# Patient Record
Sex: FEMALE | Race: WHITE | NOT HISPANIC OR LATINO | Employment: OTHER | URBAN - NONMETROPOLITAN AREA
[De-identification: names, ages, dates, MRNs, and addresses within clinical notes are randomized per-mention and may not be internally consistent; named-entity substitution may affect disease eponyms.]

---

## 2023-12-22 ENCOUNTER — TELEPHONE (OUTPATIENT)
Dept: NEUROSURGERY | Facility: CLINIC | Age: 53
End: 2023-12-22
Payer: MEDICAID

## 2023-12-22 NOTE — TELEPHONE ENCOUNTER
PT CALLED STATES OUR OFFICE HAS TO SEND A REQUEST TO THE [facility] IN [facility] FOR THEM TO SEND US THE IMAGING ON DISC. FAX NUMBER [phone]. PT STATES REQUEST HAS TO STATE CONTINUE OF CARE.     THANK YOU,

## 2023-12-22 NOTE — TELEPHONE ENCOUNTER
I HAVE SENT OVER FAX REQUEST FOR IMAGING TO NUMBER PROVIDED. I NOTED WE HAVE [employer] NUMBER AVAILABLE TO MAIL THE DISC TO US IF THEY NEED IT, TO CALL ME DIRECTLY.

## 2024-01-10 ENCOUNTER — OFFICE VISIT (OUTPATIENT)
Dept: NEUROSURGERY | Facility: CLINIC | Age: 54
End: 2024-01-10
Payer: MEDICAID

## 2024-01-10 VITALS — HEIGHT: 66 IN | BODY MASS INDEX: 36.22 KG/M2 | WEIGHT: 225.4 LBS

## 2024-01-10 DIAGNOSIS — E66.01 CLASS 2 SEVERE OBESITY DUE TO EXCESS CALORIES WITH SERIOUS COMORBIDITY AND BODY MASS INDEX (BMI) OF 36.0 TO 36.9 IN ADULT: ICD-10-CM

## 2024-01-10 DIAGNOSIS — D32.9 MENINGIOMA: ICD-10-CM

## 2024-01-10 DIAGNOSIS — Z98.890 HISTORY OF CRANIOTOMY: Primary | ICD-10-CM

## 2024-01-10 DIAGNOSIS — Z98.2 S/P VP SHUNT: ICD-10-CM

## 2024-01-10 RX ORDER — UBIDECARENONE 75 MG
500 CAPSULE ORAL DAILY
COMMUNITY

## 2024-01-10 RX ORDER — BIMATOPROST 0.1 MG/ML
SOLUTION/ DROPS OPHTHALMIC
COMMUNITY
Start: 2023-12-19

## 2024-01-10 RX ORDER — TRAZODONE HYDROCHLORIDE 300 MG/1
1 TABLET ORAL
COMMUNITY
Start: 2023-12-04

## 2024-01-10 RX ORDER — ESCITALOPRAM OXALATE 10 MG/1
10 TABLET ORAL DAILY
COMMUNITY

## 2024-01-10 RX ORDER — LATANOPROST 0.005 %
1 DROPS OPHTHALMIC (EYE)
COMMUNITY
Start: 2023-11-15

## 2024-01-10 RX ORDER — LEVETIRACETAM 1000 MG/1
1000 TABLET ORAL
COMMUNITY
Start: 2023-11-22

## 2024-01-10 NOTE — PATIENT INSTRUCTIONS
"DASH Eating Plan  DASH stands for Dietary Approaches to Stop Hypertension. The DASH eating plan is a healthy eating plan that has been shown to:  Reduce high blood pressure (hypertension).  Reduce your risk for type 2 diabetes, heart disease, and stroke.  Help with weight loss.  What are tips for following this plan?  Reading food labels  Check food labels for the amount of salt (sodium) per serving. Choose foods with less than 5 percent of the Daily Value of sodium. Generally, foods with less than 300 milligrams (mg) of sodium per serving fit into this eating plan.  To find whole grains, look for the word \"whole\" as the first word in the ingredient list.  Shopping  Buy products labeled as \"low-sodium\" or \"no salt added.\"  Buy fresh foods. Avoid canned foods and pre-made or frozen meals.  Cooking  Avoid adding salt when cooking. Use salt-free seasonings or herbs instead of table salt or sea salt. Check with your health care provider or pharmacist before using salt substitutes.  Do not aquino foods. Cook foods using healthy methods such as baking, boiling, grilling, roasting, and broiling instead.  Cook with heart-healthy oils, such as olive, canola, avocado, soybean, or sunflower oil.  Meal planning    Eat a balanced diet that includes:  4 or more servings of fruits and 4 or more servings of vegetables each day. Try to fill one-half of your plate with fruits and vegetables.  6-8 servings of whole grains each day.  Less than 6 oz (170 g) of lean meat, poultry, or fish each day. A 3-oz (85-g) serving of meat is about the same size as a deck of cards. One egg equals 1 oz (28 g).  2-3 servings of low-fat dairy each day. One serving is 1 cup (237 mL).  1 serving of nuts, seeds, or beans 5 times each week.  2-3 servings of heart-healthy fats. Healthy fats called omega-3 fatty acids are found in foods such as walnuts, flaxseeds, fortified milks, and eggs. These fats are also found in cold-water fish, such as sardines, salmon, " and mackerel.  Limit how much you eat of:  Canned or prepackaged foods.  Food that is high in trans fat, such as some fried foods.  Food that is high in saturated fat, such as fatty meat.  Desserts and other sweets, sugary drinks, and other foods with added sugar.  Full-fat dairy products.  Do not salt foods before eating.  Do not eat more than 4 egg yolks a week.  Try to eat at least 2 vegetarian meals a week.  Eat more home-cooked food and less restaurant, buffet, and fast food.  Lifestyle  When eating at a restaurant, ask that your food be prepared with less salt or no salt, if possible.  If you drink alcohol:  Limit how much you use to:  0-1 drink a day for women who are not pregnant.  0-2 drinks a day for men.  Be aware of how much alcohol is in your drink. In the [place] one drink equals one 12 oz bottle of beer (355 mL), one 5 oz glass of wine (148 mL), or one 1½ oz glass of hard liquor (44 mL).  General information  Avoid eating more than 2,300 mg of salt a day. If you have hypertension, you may need to reduce your sodium intake to 1,500 mg a day.  Work with your health care provider to maintain a healthy body weight or to lose weight. Ask what an ideal weight is for you.  Get at least 30 minutes of exercise that causes your heart to beat faster (aerobic exercise) most days of the week. Activities may include walking, swimming, or biking.  Work with your health care provider or dietitian to adjust your eating plan to your individual calorie needs.  What foods should I eat?  Fruits  All fresh, dried, or frozen fruit. Canned fruit in natural juice (without added sugar).  Vegetables  Fresh or frozen vegetables (raw, steamed, roasted, or grilled). Low-sodium or reduced-sodium tomato and vegetable juice. Low-sodium or reduced-sodium tomato sauce and tomato paste. Low-sodium or reduced-sodium canned vegetables.  Grains  Whole-grain or whole-wheat bread. Whole-grain or whole-wheat pasta. Brown rice. Oatmeal. Quinoa.  Bulgur. Whole-grain and low-sodium cereals. Tiara bread. Low-fat, low-sodium crackers. Whole-wheat flour tortillas.  Meats and other proteins  Skinless chicken or turkey. Ground chicken or turkey. Pork with fat trimmed off. Fish and seafood. Egg whites. Dried beans, peas, or lentils. Unsalted nuts, nut butters, and seeds. Unsalted canned beans. Lean cuts of beef with fat trimmed off. Low-sodium, lean precooked or cured meat, such as sausages or meat loaves.  Dairy  Low-fat (1%) or fat-free (skim) milk. Reduced-fat, low-fat, or fat-free cheeses. Nonfat, low-sodium ricotta or cottage cheese. Low-fat or nonfat yogurt. Low-fat, low-sodium cheese.  Fats and oils  Soft margarine without trans fats. Vegetable oil. Reduced-fat, low-fat, or light mayonnaise and salad dressings (reduced-sodium). Canola, safflower, olive, avocado, soybean, and sunflower oils. Avocado.  Seasonings and condiments  Herbs. Spices. Seasoning mixes without salt.  Other foods  Unsalted popcorn and pretzels. Fat-free sweets.  The items listed above may not be a complete list of foods and beverages you can eat. Contact a dietitian for more information.  What foods should I avoid?  Fruits  Canned fruit in a light or heavy syrup. Fried fruit. Fruit in cream or butter sauce.  Vegetables  Creamed or fried vegetables. Vegetables in a cheese sauce. Regular canned vegetables (not low-sodium or reduced-sodium). Regular canned tomato sauce and paste (not low-sodium or reduced-sodium). Regular tomato and vegetable juice (not low-sodium or reduced-sodium). Pickles. Olives.  Grains  Baked goods made with fat, such as croissants, muffins, or some breads. Dry pasta or rice meal packs.  Meats and other proteins  Fatty cuts of meat. Ribs. Fried meat. Tariq. Bologna, salami, and other precooked or cured meats, such as sausages or meat loaves. Fat from the back of a pig (fatback). Bratwurst. Salted nuts and seeds. Canned beans with added salt. Canned or smoked fish.  Whole eggs or egg yolks. Chicken or turkey with skin.  Dairy  Whole or 2% milk, cream, and half-and-half. Whole or full-fat cream cheese. Whole-fat or sweetened yogurt. Full-fat cheese. Nondairy creamers. Whipped toppings. Processed cheese and cheese spreads.  Fats and oils  Butter. Stick margarine. Lard. Shortening. Ghee. Tariq fat. Tropical oils, such as coconut, palm kernel, or palm oil.  Seasonings and condiments  Onion salt, garlic salt, seasoned salt, table salt, and sea salt. Worcestershire sauce. Tartar sauce. Barbecue sauce. Teriyaki sauce. Soy sauce, including reduced-sodium. Steak sauce. Canned and packaged gravies. Fish sauce. Oyster sauce. Cocktail sauce. Store-bought horseradish. Ketchup. Mustard. Meat flavorings and tenderizers. Bouillon cubes. Hot sauces. Pre-made or packaged marinades. Pre-made or packaged taco seasonings. Relishes. Regular salad dressings.  Other foods  Salted popcorn and pretzels.  The items listed above may not be a complete list of foods and beverages you should avoid. Contact a dietitian for more information.  Where to find more information  National Heart, Lung, and Blood [place]: www.nhlbi.nih.gov  American Heart Association: www.heart.org  Academy of Nutrition and Dietetics: www.eatright.org  National Kidney Foundation: www.kidney.org  Summary  The DASH eating plan is a healthy eating plan that has been shown to reduce high blood pressure (hypertension). It may also reduce your risk for type 2 diabetes, heart disease, and stroke.  When on the DASH eating plan, aim to eat more fresh fruits and vegetables, whole grains, lean proteins, low-fat dairy, and heart-healthy fats.  With the DASH eating plan, you should limit salt (sodium) intake to 2,300 mg a day. If you have hypertension, you may need to reduce your sodium intake to 1,500 mg a day.  Work with your health care provider or dietitian to adjust your eating plan to your individual calorie needs.  This information is not  intended to replace advice given to you by your health care provider. Make sure you discuss any questions you have with your health care provider.  Document Revised: 11/20/2020 Document Reviewed: 11/20/2020  Elsevier Patient Education © 2023 Elsevier Inc.

## 2024-01-10 NOTE — PROGRESS NOTES
Primary Care Provider: Hali George APRN  Requesting Provider: No ref. provider found    Chief Complaint:   Chief Complaint   Patient presents with    Brain Tumor     Pt is here to establish care.  History of craniotomy for tumor and  shunt placement     History of Present Illness  Consultation at the request of No ref. provider found.    LANA Santiago is a 53 y.o. female whom presents today to establish care.  History of a craniotomy for WHO grade 2 left cavernous sinus meningioma in 2014 by Dr. [facility] [place], [place].  Followed by a right ventriculoperitoneal shunt placement.  Preoperatively she complained of persistent headaches, visual disturbances, dizziness.  Her symptoms improved postoperatively.  She is unsure at what point the  shunt was placed or causative factors and/or symptoms surrounding  shunt placement.    Ms. Santiago presents today asymptomatic to establish care.  Her primary recurrent complaints include a chronic and unchanged impaired memory and a left ocular and frontal headache.  Her last headache occurred approximately 2 weeks ago and is poorly well-controlled with Tylenol as she was previously prescribed oxycodone by Dr. Rose.  She denies additional symptoms associated with her headaches to include, but not limited to dizziness, blurred vision, photophobia, phonophobia, nausea, or vomiting.  Otherwise, she is doing well and has no new complaints or concerns.  She was last evaluated by Dr. Rose in March 2021.  Per his note, most recent MRI obtained prior to that encounter showed no significant change in residual tumor.   shunt setting at 2.5.  No additional questions or concerns at this time.  Ms. Santiago currently rates the severity of her symptoms 0/10.    ROS  Review of Systems   HENT:  Positive for dental problem.    Eyes: Negative.    Respiratory: Negative.     Cardiovascular:  Positive for leg swelling.   Gastrointestinal: Negative.   "  Endocrine: Negative.    Genitourinary: Negative.    Musculoskeletal: Negative.    Skin: Negative.    Allergic/Immunologic: Negative.    Neurological:  Positive for seizures, speech difficulty, weakness, light-headedness and headaches.   Hematological: Negative.    Psychiatric/Behavioral:  Positive for sleep disturbance.    All other systems reviewed and are negative.    Past Medical History:   Diagnosis Date    Headache      Past Surgical History:   Procedure Laterality Date    BRAIN SURGERY      craniotomy for WHO grade 2 left cavernous sinus meningioma in 2014 by Dr. Rose    SHOULDER SURGERY      VENTRICULOPERITONEAL SHUNT Right     Current setting 2.5 cm H2O     Family History: family history includes Cancer in her brother and mother; No Known Problems in her father.    Social History:  reports that she has quit smoking. Her smoking use included cigarettes. She does not have any smokeless tobacco history on file. She reports current alcohol use. She reports current drug use. Drug: Marijuana.    Medications:    Current Outpatient Medications:     escitalopram (LEXAPRO) 10 MG tablet, Take 1 tablet by mouth Daily., Disp: , Rfl:     levETIRAcetam (KEPPRA) 1000 MG tablet, Take 1 tablet by mouth., Disp: , Rfl:     Lumigan 0.01 % ophthalmic drops, INSTILL ONE DROP IN EACH EYE IN THE EVENING, Disp: , Rfl:     traZODone (DESYREL) 300 MG tablet, Take 1 tablet by mouth every night at bedtime., Disp: , Rfl:     vitamin B-12 (cyanocobalamin) 100 MCG tablet, Take 5 tablets by mouth Daily., Disp: , Rfl:     Xalatan 0.005 % ophthalmic solution, Apply 1 drop to eye(s) as directed by provider., Disp: , Rfl:     Allergies:  Patient has no known allergies.    Objective   Ht 167.6 cm (66\")   Wt 102 kg (225 lb 6.4 oz)   BMI 36.38 kg/m²   Physical Exam  Vitals and nursing note reviewed.   Constitutional:       General: She is not in acute distress.     Appearance: Normal appearance. She is well-developed and well-groomed. She is " obese. She is not ill-appearing, toxic-appearing or diaphoretic.      Comments: BMI 36.38   HENT:      Head: Normocephalic and atraumatic.      Right Ear: Hearing normal.      Left Ear: Hearing normal.   Eyes:      Extraocular Movements: EOM normal.      Conjunctiva/sclera: Conjunctivae normal.      Pupils: Pupils are equal, round, and reactive to light.   Neck:      Trachea: Trachea normal.   Cardiovascular:      Rate and Rhythm: Normal rate and regular rhythm.   Pulmonary:      Effort: Pulmonary effort is normal. No tachypnea, bradypnea, accessory muscle usage or respiratory distress.   Abdominal:      Palpations: Abdomen is soft.   Musculoskeletal:      Cervical back: Full passive range of motion without pain and neck supple.   Skin:     General: Skin is warm and dry.   Neurological:      Mental Status: She is alert and oriented to person, place, and time.      GCS: GCS eye subscore is 4. GCS verbal subscore is 5. GCS motor subscore is 6.      Gait: Gait is intact.      Deep Tendon Reflexes:      Reflex Scores:       Tricep reflexes are 1+ on the right side and 1+ on the left side.       Bicep reflexes are 1+ on the right side and 1+ on the left side.       Brachioradialis reflexes are 1+ on the right side and 1+ on the left side.       Patellar reflexes are 1+ on the right side and 1+ on the left side.       Achilles reflexes are 1+ on the right side and 1+ on the left side.  Psychiatric:         Speech: Speech normal.         Behavior: Behavior normal. Behavior is cooperative.       Neurologic Exam     Mental Status   Oriented to person, place, and time.   Attention: normal. Concentration: normal.   Speech: speech is normal   Level of consciousness: alert    Cranial Nerves     CN II   Visual fields full to confrontation.     CN III, IV, VI   Pupils are equal, round, and reactive to light.  Extraocular motions are normal.     CN V   Facial sensation intact.     CN VII   Facial expression full, symmetric.     CN  VIII   CN VIII normal.     CN IX, X   CN IX normal.     CN XI   CN XI normal.     Motor Exam   Right arm tone: normal  Left arm tone: normal  Right leg tone: normal  Left leg tone: normal    Strength   Right deltoid: 5/5  Left deltoid: 5/5  Right biceps: 5/5  Left biceps: 5/5  Right triceps: 5/5  Left triceps: 5/5  Right wrist extension: 5/5  Left wrist extension: 5/5  Right iliopsoas: 5/5  Left iliopsoas: 5/5  Right quadriceps: 5/5  Left quadriceps: 5/5  Right anterior tibial: 5/5  Left anterior tibial: 5/5  Right gastroc: 5/5  Left gastroc: 5/5  Right EHL 5/5  Left EHL 5/5       Sensory Exam   Right arm light touch: normal  Left arm light touch: normal  Right leg light touch: normal  Left leg light touch: normal    Gait, Coordination, and Reflexes     Gait  Gait: normal    Tremor   Resting tremor: absent  Intention tremor: absent  Action tremor: absent    Reflexes   Right brachioradialis: 1+  Left brachioradialis: 1+  Right biceps: 1+  Left biceps: 1+  Right triceps: 1+  Left triceps: 1+  Right patellar: 1+  Left patellar: 1+  Right achilles: 1+  Left achilles: 1+  Right plantar: normal  Left plantar: normal  Right Veliz: absent  Left Veliz: absent  Right ankle clonus: absent  Left ankle clonus: absent  Right pendular knee jerk: absent  Left pendular knee jerk: absent    Imaging: (independent review and interpretation)  11/15/2023      11/19/2023      ASSESSMENT and PLAN  Angelica Santiago is a 53 y.o. female with a significant medical history of craniotomy for WHO grade 2 left cavernous sinus meningioma and right  shunt placement (2014 by Dr. Rose [facility] in [place], [place]), seizure disorder, recurrent headaches, former smoker, and obesity.  She presents today asymptomatic to establish care as she is recently relocated to the area.  Physical exam findings of right  shunt is palpable to the right parietal scalp, pumps and refills well, current shunt setting at 2.5 cm H2O, otherwise gross  hyporeflexia.  Her imaging shows a partially resected meningioma within the left cavernous sinus, right  shunt placement asymmetric ventricles with vasogenic edema surrounding the left lateral ventricle extending into the left temporal lobe.    Recommendations  History of craniotomy and right  shunt placement for tumor  WHO grade II meningioma  Chronic recurring headaches  Ms. Santiago is currently asymptomatic.  She does complain of recurrent headaches, as well as unchanged memory deficits.  For further evaluation we will send her for  shunt series x-rays.  Considering she is asymptomatic we will have her return in 1 year for reevaluation with repeat MRI of the brain with and without contrast.  Ms. Santiago did request a prescription for Percocet for her headaches.  We will place a referral to Dr. English for evaluation of her chronic symptoms.  Ms. Santiago may contact the neurosurgical clinic to return sooner for any new or additional concerns and agrees with this plan of care.    Class 2 obesity (BMI 35.0-39.9) due to excessive calories with serious comorbidities BMI of 36.0-36.9 in adult  Body mass index is 36.38 kg/m². Information on the DASH diet provided in the AVS.  We will continue to provided diet and exercise information with the goal of weight loss at each scheduled appointment.     Diagnoses and all orders for this visit:    1. History of craniotomy (Primary)  -     MRI Brain With & Without Contrast; Future  -     XR shunt series; Future  -     Ambulatory Referral to Pain Management    2. S/P  shunt  -     MRI Brain With & Without Contrast; Future  -     XR shunt series; Future  -     Ambulatory Referral to Pain Management    3. Meningioma  -     MRI Brain With & Without Contrast; Future  -     XR shunt series; Future  -     Ambulatory Referral to Pain Management    4. Class 2 severe obesity due to excess calories with serious comorbidity and body mass index (BMI) of 36.0 to 36.9 in adult      Return  for FOLLOWUP WITH DR. BARONE IN 1 YEAR.   .    Thank you for this Consultation and the opportunity to participate in Angelica's care.    Sincerely,  ALEXANDRA Narnajo

## 2024-07-27 ENCOUNTER — APPOINTMENT (OUTPATIENT)
Dept: CT IMAGING | Age: 54
End: 2024-07-27
Payer: MEDICAID

## 2024-07-27 ENCOUNTER — HOSPITAL ENCOUNTER (EMERGENCY)
Age: 54
Discharge: HOME | End: 2024-07-27
Attending: STUDENT IN AN ORGANIZED HEALTH CARE EDUCATION/TRAINING PROGRAM
Payer: MEDICAID

## 2024-07-27 ENCOUNTER — APPOINTMENT (OUTPATIENT)
Dept: GENERAL RADIOLOGY | Age: 54
End: 2024-07-27
Payer: MEDICAID

## 2024-07-27 VITALS
HEIGHT: 66 IN | WEIGHT: 230 LBS | RESPIRATION RATE: 11 BRPM | DIASTOLIC BLOOD PRESSURE: 50 MMHG | HEART RATE: 45 BPM | OXYGEN SATURATION: 97 % | SYSTOLIC BLOOD PRESSURE: 93 MMHG | BODY MASS INDEX: 36.96 KG/M2 | TEMPERATURE: 97.7 F

## 2024-07-27 DIAGNOSIS — R51.9 ACUTE NONINTRACTABLE HEADACHE, UNSPECIFIED HEADACHE TYPE: Primary | ICD-10-CM

## 2024-07-27 LAB
ALBUMIN SERPL-MCNC: 3.9 G/DL (ref 3.5–5.2)
ALP SERPL-CCNC: 84 U/L (ref 35–104)
ALT SERPL-CCNC: 14 U/L (ref 5–33)
ANION GAP SERPL CALCULATED.3IONS-SCNC: 10 MMOL/L (ref 7–19)
AST SERPL-CCNC: 13 U/L (ref 5–32)
BASOPHILS # BLD: 0.1 K/UL (ref 0–0.2)
BASOPHILS NFR BLD: 0.8 % (ref 0–1)
BILIRUB SERPL-MCNC: 0.3 MG/DL (ref 0.2–1.2)
BUN SERPL-MCNC: 15 MG/DL (ref 6–20)
CALCIUM SERPL-MCNC: 9.2 MG/DL (ref 8.6–10)
CHLORIDE SERPL-SCNC: 107 MMOL/L (ref 98–111)
CO2 SERPL-SCNC: 25 MMOL/L (ref 22–29)
CREAT SERPL-MCNC: 0.8 MG/DL (ref 0.5–0.9)
EOSINOPHIL # BLD: 0.1 K/UL (ref 0–0.6)
EOSINOPHIL NFR BLD: 1.5 % (ref 0–5)
ERYTHROCYTE [DISTWIDTH] IN BLOOD BY AUTOMATED COUNT: 12 % (ref 11.5–14.5)
GLUCOSE SERPL-MCNC: 104 MG/DL (ref 74–109)
HCG SERPL QL: NEGATIVE
HCT VFR BLD AUTO: 42.6 % (ref 37–47)
HGB BLD-MCNC: 14.1 G/DL (ref 12–16)
IMM GRANULOCYTES # BLD: 0 K/UL
LYMPHOCYTES # BLD: 1.7 K/UL (ref 1.1–4.5)
LYMPHOCYTES NFR BLD: 28.6 % (ref 20–40)
MCH RBC QN AUTO: 30.3 PG (ref 27–31)
MCHC RBC AUTO-ENTMCNC: 33.1 G/DL (ref 33–37)
MCV RBC AUTO: 91.6 FL (ref 81–99)
MONOCYTES # BLD: 0.5 K/UL (ref 0–0.9)
MONOCYTES NFR BLD: 8 % (ref 0–10)
NEUTROPHILS # BLD: 3.7 K/UL (ref 1.5–7.5)
NEUTS SEG NFR BLD: 60.9 % (ref 50–65)
PLATELET # BLD AUTO: 193 K/UL (ref 130–400)
PMV BLD AUTO: 9.2 FL (ref 9.4–12.3)
POTASSIUM SERPL-SCNC: 3.8 MMOL/L (ref 3.5–5)
PROT SERPL-MCNC: 6.2 G/DL (ref 6.6–8.7)
RBC # BLD AUTO: 4.65 M/UL (ref 4.2–5.4)
SODIUM SERPL-SCNC: 142 MMOL/L (ref 136–145)
WBC # BLD AUTO: 6 K/UL (ref 4.8–10.8)

## 2024-07-27 PROCEDURE — 99285 EMERGENCY DEPT VISIT HI MDM: CPT

## 2024-07-27 PROCEDURE — 96374 THER/PROPH/DIAG INJ IV PUSH: CPT

## 2024-07-27 PROCEDURE — 36415 COLL VENOUS BLD VENIPUNCTURE: CPT

## 2024-07-27 PROCEDURE — 6360000002 HC RX W HCPCS: Performed by: STUDENT IN AN ORGANIZED HEALTH CARE EDUCATION/TRAINING PROGRAM

## 2024-07-27 PROCEDURE — 84703 CHORIONIC GONADOTROPIN ASSAY: CPT

## 2024-07-27 PROCEDURE — 70450 CT HEAD/BRAIN W/O DYE: CPT

## 2024-07-27 PROCEDURE — 80053 COMPREHEN METABOLIC PANEL: CPT

## 2024-07-27 PROCEDURE — 2580000003 HC RX 258: Performed by: STUDENT IN AN ORGANIZED HEALTH CARE EDUCATION/TRAINING PROGRAM

## 2024-07-27 PROCEDURE — 70250 X-RAY EXAM OF SKULL: CPT

## 2024-07-27 PROCEDURE — 6360000004 HC RX CONTRAST MEDICATION: Performed by: STUDENT IN AN ORGANIZED HEALTH CARE EDUCATION/TRAINING PROGRAM

## 2024-07-27 PROCEDURE — 70481 CT ORBIT/EAR/FOSSA W/DYE: CPT

## 2024-07-27 PROCEDURE — 96375 TX/PRO/DX INJ NEW DRUG ADDON: CPT

## 2024-07-27 PROCEDURE — 85025 COMPLETE CBC W/AUTO DIFF WBC: CPT

## 2024-07-27 RX ORDER — METOCLOPRAMIDE HYDROCHLORIDE 5 MG/ML
5 INJECTION INTRAMUSCULAR; INTRAVENOUS ONCE
Status: COMPLETED | OUTPATIENT
Start: 2024-07-27 | End: 2024-07-27

## 2024-07-27 RX ORDER — FENTANYL CITRATE 50 UG/ML
50 INJECTION, SOLUTION INTRAMUSCULAR; INTRAVENOUS ONCE
Status: COMPLETED | OUTPATIENT
Start: 2024-07-27 | End: 2024-07-27

## 2024-07-27 RX ORDER — KETOROLAC TROMETHAMINE 30 MG/ML
30 INJECTION, SOLUTION INTRAMUSCULAR; INTRAVENOUS ONCE
Status: COMPLETED | OUTPATIENT
Start: 2024-07-27 | End: 2024-07-27

## 2024-07-27 RX ORDER — 0.9 % SODIUM CHLORIDE 0.9 %
1000 INTRAVENOUS SOLUTION INTRAVENOUS ONCE
Status: COMPLETED | OUTPATIENT
Start: 2024-07-27 | End: 2024-07-27

## 2024-07-27 RX ORDER — DIPHENHYDRAMINE HYDROCHLORIDE 50 MG/ML
25 INJECTION INTRAMUSCULAR; INTRAVENOUS ONCE
Status: COMPLETED | OUTPATIENT
Start: 2024-07-27 | End: 2024-07-27

## 2024-07-27 RX ORDER — MORPHINE SULFATE 4 MG/ML
4 INJECTION, SOLUTION INTRAMUSCULAR; INTRAVENOUS ONCE
Status: COMPLETED | OUTPATIENT
Start: 2024-07-27 | End: 2024-07-27

## 2024-07-27 RX ADMIN — KETOROLAC TROMETHAMINE 30 MG: 30 INJECTION, SOLUTION INTRAMUSCULAR at 14:25

## 2024-07-27 RX ADMIN — DIPHENHYDRAMINE HYDROCHLORIDE 25 MG: 50 INJECTION INTRAMUSCULAR; INTRAVENOUS at 12:49

## 2024-07-27 RX ADMIN — FENTANYL CITRATE 50 MCG: 50 INJECTION INTRAMUSCULAR; INTRAVENOUS at 14:24

## 2024-07-27 RX ADMIN — MORPHINE SULFATE 4 MG: 4 INJECTION, SOLUTION INTRAMUSCULAR; INTRAVENOUS at 12:52

## 2024-07-27 RX ADMIN — SODIUM CHLORIDE 1000 ML: 9 INJECTION, SOLUTION INTRAVENOUS at 12:49

## 2024-07-27 RX ADMIN — METOCLOPRAMIDE 5 MG: 5 INJECTION, SOLUTION INTRAMUSCULAR; INTRAVENOUS at 12:50

## 2024-07-27 RX ADMIN — IOPAMIDOL 70 ML: 755 INJECTION, SOLUTION INTRAVENOUS at 13:31

## 2024-07-27 ASSESSMENT — ENCOUNTER SYMPTOMS
COUGH: 0
EYE REDNESS: 0
NAUSEA: 1
EYE PAIN: 1
SORE THROAT: 0
VOMITING: 1
SHORTNESS OF BREATH: 0
DIARRHEA: 0
CHEST TIGHTNESS: 0
PHOTOPHOBIA: 1
ABDOMINAL PAIN: 0

## 2024-07-27 NOTE — ED NOTES
Attempted to call pt son so he could get a hold of pt boyfriend to come get her. No answer. Primary nurse aware.

## 2024-07-27 NOTE — ED PROVIDER NOTES
HARPAL HUSSEIN   Date:     07/27/2024   Time:    14:06               LABS:  Labs Reviewed   CBC WITH AUTO DIFFERENTIAL - Abnormal; Notable for the following components:       Result Value    MPV 9.2 (*)     All other components within normal limits   COMPREHENSIVE METABOLIC PANEL W/ REFLEX TO MG FOR LOW K - Abnormal; Notable for the following components:    Total Protein 6.2 (*)     All other components within normal limits   HCG, SERUM, QUALITATIVE       All other labs were within normal range or not returned as of this dictation.    EMERGENCY DEPARTMENT COURSE and DIFFERENTIAL DIAGNOSIS/MDM:   Vitals:    Vitals:    07/27/24 1445 07/27/24 1500 07/27/24 1515 07/27/24 1530   BP: (!) 110/59 (!) 111/58 (!) 111/52 (!) 111/58   Pulse: (!) 49 52 (!) 46 50   Resp: 18 10 12 11   Temp:       SpO2: 95% 95% 97% 97%   Weight:       Height:           MDM      Reassessment    Patient is a 53 y.o. female presenting with headache.  Apparent history of meningioma status postcraniotomy and  shunt placement in [facility] in 2014.    Labs obtained as detailed above are unremarkable.  CT head, orbits, and shunt series without acute abnormalities.  Patient given multiple rounds of medication for headache with complete resolution of headache.    Working with social work to determine how patient can get home since she was brought in by EMS and does not know any phone numbers of any friends or family member who can come get her.  Patient will be discharged once social work determines how she will get a ride home    CONSULTS:  None    :  Unless otherwise noted below, none     Procedures    FINAL IMPRESSION      1. Acute nonintractable headache, unspecified headache type          DISPOSITION/PLAN   DISPOSITION Decision To Discharge 07/27/2024 03:24:52 PM      PATIENT REFERRED TO:  [facility] EMERGENCY DEPT  [address] [address]  [phone]  Go to   As needed, If symptoms worsen      DISCHARGE MEDICATIONS:  New

## 2024-07-27 NOTE — CARE COORDINATION
After exhausting all options pt was provided a cab voucher [place] [address] [place] at a cost of $93

## 2024-07-27 NOTE — ED NOTES
Pt does not have anyone that can give her a ride. Pts boyfriend does not have access to a car. Social Work attempting to help pt with a ride.    Spoke with  will attempt to see him tomorrow before weekend to evaluate wound.     DO [facility]  12/28/2023 1:24 PM

## 2025-01-06 DIAGNOSIS — D32.9 MENINGIOMA: ICD-10-CM

## 2025-01-06 DIAGNOSIS — Z98.890 HISTORY OF CRANIOTOMY: Primary | ICD-10-CM

## 2025-01-06 DIAGNOSIS — Z98.2 S/P VP SHUNT: ICD-10-CM

## 2025-04-14 ENCOUNTER — OFFICE VISIT (OUTPATIENT)
Dept: INTERNAL MEDICINE | Facility: CLINIC | Age: 55
End: 2025-04-14
Payer: MEDICAID

## 2025-04-14 ENCOUNTER — PATIENT ROUNDING (BHMG ONLY) (OUTPATIENT)
Dept: INTERNAL MEDICINE | Facility: CLINIC | Age: 55
End: 2025-04-14

## 2025-04-14 VITALS
TEMPERATURE: 96.8 F | DIASTOLIC BLOOD PRESSURE: 67 MMHG | BODY MASS INDEX: 37.61 KG/M2 | OXYGEN SATURATION: 98 % | RESPIRATION RATE: 16 BRPM | WEIGHT: 234 LBS | SYSTOLIC BLOOD PRESSURE: 107 MMHG | HEIGHT: 66 IN | HEART RATE: 54 BPM

## 2025-04-14 DIAGNOSIS — E66.9 TYPE 2 DIABETES MELLITUS WITH OBESITY: ICD-10-CM

## 2025-04-14 DIAGNOSIS — E78.1 HIGH TRIGLYCERIDES: ICD-10-CM

## 2025-04-14 DIAGNOSIS — E11.69 TYPE 2 DIABETES MELLITUS WITH OBESITY: ICD-10-CM

## 2025-04-14 DIAGNOSIS — F33.2 SEVERE EPISODE OF RECURRENT MAJOR DEPRESSIVE DISORDER, WITHOUT PSYCHOTIC FEATURES: ICD-10-CM

## 2025-04-14 DIAGNOSIS — Z98.84 HISTORY OF ROUX-EN-Y GASTRIC BYPASS: ICD-10-CM

## 2025-04-14 DIAGNOSIS — G47.00 PERSISTENT INSOMNIA: ICD-10-CM

## 2025-04-14 DIAGNOSIS — Z23 NEED FOR PNEUMOCOCCAL VACCINATION: ICD-10-CM

## 2025-04-14 DIAGNOSIS — Z76.89 ENCOUNTER TO ESTABLISH CARE: Primary | ICD-10-CM

## 2025-04-14 DIAGNOSIS — E03.9 ADULT HYPOTHYROIDISM: ICD-10-CM

## 2025-04-14 DIAGNOSIS — Z11.59 ENCOUNTER FOR HEPATITIS C SCREENING TEST FOR LOW RISK PATIENT: ICD-10-CM

## 2025-04-14 DIAGNOSIS — Z12.31 SCREENING MAMMOGRAM FOR BREAST CANCER: ICD-10-CM

## 2025-04-14 DIAGNOSIS — M17.0 PRIMARY OSTEOARTHRITIS OF BOTH KNEES: ICD-10-CM

## 2025-04-14 DIAGNOSIS — F41.9 ANXIETY: ICD-10-CM

## 2025-04-14 PROBLEM — H40.9 GLAUCOMA: Status: ACTIVE | Noted: 2023-06-15

## 2025-04-14 PROBLEM — M19.011 PRIMARY OSTEOARTHRITIS, RIGHT SHOULDER: Status: ACTIVE | Noted: 2019-06-14

## 2025-04-14 PROBLEM — Z86.018 S/P RESECTION OF MENINGIOMA: Status: RESOLVED | Noted: 2017-04-29 | Resolved: 2025-04-14

## 2025-04-14 PROBLEM — Z98.890 S/P RESECTION OF MENINGIOMA: Status: RESOLVED | Noted: 2017-04-29 | Resolved: 2025-04-14

## 2025-04-14 PROBLEM — K21.9 CHRONIC GERD: Status: ACTIVE | Noted: 2019-11-21

## 2025-04-14 PROBLEM — G91.9 HYDROCEPHALUS: Status: RESOLVED | Noted: 2017-05-23 | Resolved: 2025-04-14

## 2025-04-14 PROBLEM — H53.2 DIPLOPIA: Status: ACTIVE | Noted: 2017-04-29

## 2025-04-14 PROBLEM — F32.0 CURRENT MILD EPISODE OF MAJOR DEPRESSIVE DISORDER: Status: ACTIVE | Noted: 2020-09-25

## 2025-04-14 PROBLEM — R41.89 IMPAIRED COGNITION: Status: ACTIVE | Noted: 2017-04-29

## 2025-04-14 PROBLEM — R53.83 FATIGUE: Status: ACTIVE | Noted: 2023-06-15

## 2025-04-14 PROBLEM — M72.2 PLANTAR FASCIITIS, RIGHT: Status: RESOLVED | Noted: 2020-01-07 | Resolved: 2025-04-14

## 2025-04-14 RX ORDER — NAPROXEN 500 MG/1
500 TABLET ORAL 2 TIMES DAILY WITH MEALS
Qty: 60 TABLET | Refills: 1 | Status: SHIPPED | OUTPATIENT
Start: 2025-04-14

## 2025-04-14 RX ORDER — TRAZODONE HYDROCHLORIDE 300 MG/1
300 TABLET ORAL
Qty: 30 TABLET | Refills: 2 | Status: SHIPPED | OUTPATIENT
Start: 2025-04-14

## 2025-04-14 RX ORDER — ESCITALOPRAM OXALATE 10 MG/1
10 TABLET ORAL DAILY
Qty: 30 TABLET | Refills: 0 | Status: SHIPPED | OUTPATIENT
Start: 2025-04-14

## 2025-04-14 NOTE — LETTER
[facility]  Vaccine Consent Form    Patient Name:  Angelica Santiago  Patient :  1970     Vaccine(s) Ordered    Pneumococcal Conjugate Vaccine 20-Valent All        Screening Checklist  The following questions should be completed prior to vaccination. If you answer “yes” to any question, it does not necessarily mean you should not be vaccinated. It just means we may need to clarify or ask more questions. If a question is unclear, please ask your healthcare provider to explain it.    Yes No   Any fever or moderate to severe illness today (mild illness and/or antibiotic treatment are not contraindications)?     Do you have a history of a serious reaction to any previous vaccinations, such as anaphylaxis, encephalopathy within 7 days, Guillain-Howard syndrome within 6 weeks, seizure?     Have you received any live vaccine(s) (e.g MMR, TAHIRA) or any other vaccines in the last month (to ensure duplicate doses aren't given)?     Do you have an anaphylactic allergy to latex (DTaP, DTaP-IPV, Hep A, Hep B, MenB, RV, Td, Tdap), baker’s yeast (Hep B, HPV), polysorbates (RSV, nirsevimab, PCV 20, Rotavirrus, Tdap, Shingrix), or gelatin (TAHIRA, MMR)?     Do you have an anaphylactic allergy to neomycin (Rabies, TAHIRA, MMR, IPV, Hep A), polymyxin B (IPV), or streptomycin (IPV)?      Any cancer, leukemia, AIDS, or other immune system disorder? (TAHIRA, MMR, RV)     Do you have a parent, brother, or sister with an immune system problem (if immune competence of vaccine recipient clinically verified, can proceed)? (MMR, TAHIRA)     Any recent steroid treatments for >2 weeks, chemotherapy, or radiation treatment? (TAHIRA, MMR)     Have you received antibody-containing blood transfusions or IVIG in the past 11 months (recommended interval is dependent on product)? (MMR, TAHIRA)     Have you taken antiviral drugs (acyclovir, famciclovir, valacyclovir for TAHIRA) in the last 24 or 48 hours, respectively?      Are you pregnant or planning to become  "pregnant within 1 month? (TAHIRA, MMR, HPV, IPV, MenB, Abrexvy; For Hep B- refer to Engerix-B; For RSV - Abrysvo is indicated for 32-36 weeks of pregnancy from September to January)     For infants, have you ever been told your child has had intussusception or a medical emergency involving obstruction of the intestine (Rotavirus)? If not for an infant, can skip this question.         *Ordering Physicians/APC should be consulted if \"yes\" is checked by the patient or guardian above.  I have received, read, and understand the Vaccine Information Statement (VIS) for each vaccine ordered.  I have considered my or my child's health status as well as the health status of my close contacts.  I have taken the opportunity to discuss my vaccine questions with my or my child's health care provider.   I have requested that the ordered vaccine(s) be given to me or my child.  I understand the benefits and risks of the vaccines.  I understand that I should remain in the clinic for 15 minutes after receiving the vaccine(s).  _________________________________________________________  Signature of Patient or Parent/Legal Guardian ____________________  Date     "

## 2025-04-14 NOTE — PROGRESS NOTES
Chief Complaint  Establish Care and Headache (History of brain cancer and is needing to have follow up MRI)    Subjective        Angelica Santiago presents to [facility]  History of Present Illness  Patient is a 54-year-old female presenting today to [facility].   She reports a history of brain cancer. Has been experiencing headaches. She reports poor memory after having had surgery. She is from [facility] with her prior surgery having been completed there. She reports being due for MRI but has not completed. She has seen neurosurgery in [place], [place] but reports difficulty with transportation as she does not have a vehicle.     She further has a history of anxiety and depression. Reports having taken multiple medications to include zoloft, wellbutrin, lexapro, and possibly wellbutrin. Has not taken any antidepressants in about 4 years.     Currently taking Trazodone for insomnia. With this she is able to fall asleep and stay asleep over night. Wakes up in the mornings doing ok.     Has ongoing complaints of knee pain. She has taken naproxen with relief of pain.       Past Medical History:   Diagnosis Date    Acute respiratory failure 12/04/2014    Bipolar affective disorder, current episode mixed 08/05/2009    Onset Date: 08/05/2009; Story: ASSESSMENT: need to fhave her med refill --- will give for a month and continue with Dr Diallo    Onset Date: 08/05/2009; Story: ASSESSMENT: need to fhave her med refill --- will give for a month and continue with Dr Diallo     Onset Date: 08/05/2009; Story: ASSESSMENT: need to fhave her med refill --- will give for a month and continue with Dr Diallo      Cancer     Brain cancer    Delirium 11/30/2014    Headache     Plantar fasciitis, right 01/07/2020    Story: 2/27/20: PT referral, ice, hard-soled shoes 1/7/20: Right heel pain worst in mornings and with prolonged standing, classic for plantar fasciitis, most likely d/t habitus. Less  likely plantar fibroma, fat pad contusion, achilles tendinits, calcaneal stress fracture or plantar wart. Gave stretches to do, counseled use of ice, NSAIDs, stretching. If no improvement in 4-6 weeks, will give rx fo    Type 2 diabetes mellitus with obesity 2016    Story: Pt reported diagnosis from prior PCP. Unsure when diagnosed. States it was diet controlled. Does not remember information from previous PCP so unable to request records 2019 A1c: 5.7; Impression Recorded 2020: Diet controlled T2DM. Will continue to monitor -Recommend increasing activity through water based activity, [facility] to control DM and also help      Past Surgical History:   Procedure Laterality Date    BRAIN SURGERY      craniotomy for WHO grade 2 left cavernous sinus meningioma in  by Dr. Rose    GASTRIC BYPASS      SHOULDER SURGERY      VENTRICULOPERITONEAL SHUNT Right     Current setting 2.5 cm H2O     Social History     Socioeconomic History    Marital status: Single   Tobacco Use    Smoking status: Former     Current packs/day: 0.00     Average packs/day: 1 pack/day for 10.0 years (10.0 ttl pk-yrs)     Types: Cigarettes     Start date:      Quit date:      Years since quittin.3     Passive exposure: Past    Smokeless tobacco: Never   Vaping Use    Vaping status: Never Used   Substance and Sexual Activity    Alcohol use: Yes    Drug use: Yes     Types: Marijuana    Sexual activity: Defer     Family History   Problem Relation Age of Onset    Cancer Mother     No Known Problems Father     Cancer Brother        Medications:  Current Outpatient Medications   Medication Sig Dispense Refill    escitalopram (LEXAPRO) 10 MG tablet Take 1 tablet by mouth Daily. 30 tablet 0    multivitamin with minerals (MULTIVITAMIN ADULT PO) Take 1 tablet by mouth Daily.      traZODone (DESYREL) 300 MG tablet Take 1 tablet by mouth every night at bedtime. 30 tablet 2    levETIRAcetam (KEPPRA)  "1000 MG tablet Take 1 tablet by mouth. (Patient not taking: Reported on 4/14/2025)      naproxen (Naprosyn) 500 MG tablet Take 1 tablet by mouth 2 (Two) Times a Day With Meals. 60 tablet 1    vitamin B-12 (cyanocobalamin) 100 MCG tablet Take 5 tablets by mouth Daily. (Patient not taking: Reported on 4/14/2025)       No current facility-administered medications for this visit.       Review of Systems  Review of systems is negative unless otherwise specified in HPI.    Objective   Vital Signs:  /67 (BP Location: Left arm, Patient Position: Sitting, Cuff Size: Adult)   Pulse 54   Temp 96.8 °F (36 °C) (Infrared)   Resp 16   Ht 167.6 cm (66\")   Wt 106 kg (234 lb)   SpO2 98%   BMI 37.77 kg/m²   Estimated body mass index is 37.77 kg/m² as calculated from the following:    Height as of this encounter: 167.6 cm (66\").    Weight as of this encounter: 106 kg (234 lb).       Class 2 Severe Obesity (BMI >=35 and <=39.9). Obesity-related health conditions include the following: diabetes mellitus, dyslipidemias, and GERD. Obesity is unchanged. BMI is is above average; BMI management plan is completed. We discussed portion control and increasing exercise.      PHQ-9 Depression Screening  Little interest or pleasure in doing things? Almost all   Feeling down, depressed, or hopeless? Over half   PHQ-2 Total Score 5   Trouble falling or staying asleep, or sleeping too much? Not at all (taking medication for sleep)   Feeling tired or having little energy? Over half   Poor appetite or overeating? Over half   Feeling bad about yourself - or that you are a failure or have let yourself or your family down? Over half   Trouble concentrating on things, such as reading the newspaper or watching television? Several days   Moving or speaking so slowly that other people could have noticed? Or the opposite - being so fidgety or restless that you have been moving around a lot more than usual? Not at all   Thoughts that you would be " better off dead, or of hurting yourself in some way? Not at all   PHQ-9 Total Score 12   If you checked off any problems, how difficult have these problems made it for you to do your work, take care of things at home, or get along with other people? Very difficult        LIZZIE-7: Over the last two weeks, how often have you been bothered by the following problems?  Feeling nervous, anxious or on edge: More than half the days  Not being able to stop or control worrying: More than half the days  Worrying too much about different things: Nearly every day  Trouble Relaxing: Nearly every day  Being so restless that it is hard to sit still: Nearly every day  Becoming easily annoyed or irritable: More than half the days  Feeling afraid as if something awful might happen: Several days  LIZZIE 7 Total Score: 16  If you checked any problems, how difficult have these problems made it for you to do your work, take care of things at home, or get along with other people: Very difficult      Physical Exam  Vitals and nursing note reviewed.   Constitutional:       General: She is not in acute distress.     Appearance: She is obese. She is not ill-appearing.   HENT:      Head: Normocephalic.      Nose: Nose normal.      Mouth/Throat:      Mouth: Mucous membranes are moist.   Eyes:      Pupils: Pupils are equal, round, and reactive to light.   Cardiovascular:      Rate and Rhythm: Normal rate and regular rhythm.      Pulses: Normal pulses.      Heart sounds: Normal heart sounds.   Pulmonary:      Effort: Pulmonary effort is normal. No respiratory distress.      Breath sounds: Normal breath sounds.   Musculoskeletal:         General: Normal range of motion.      Cervical back: Normal range of motion and neck supple.   Skin:     General: Skin is warm and dry.   Neurological:      General: No focal deficit present.      Mental Status: She is alert.      Motor: Motor function is intact.      Coordination: Coordination is intact.      Gait:  Gait is intact.          Result Review :                 Assessment and Plan   Diagnoses and all orders for this visit:    1. Encounter to establish care (Primary)    2. Severe episode of recurrent major depressive disorder, without psychotic features  Overview:  Last Assessment & Plan:    Major social stressors that are causing a major flare in depression and anxiety. No active plans. Would like to pursue counseling and meds.  - Start lexapro 10mg  - Referral to   -  in 4-6 weeks    Orders:  -     escitalopram (LEXAPRO) 10 MG tablet; Take 1 tablet by mouth Daily.  Dispense: 30 tablet; Refill: 0  -     Ambulatory Referral to Behavioral Health    3. Anxiety  -     escitalopram (LEXAPRO) 10 MG tablet; Take 1 tablet by mouth Daily.  Dispense: 30 tablet; Refill: 0  -     Ambulatory Referral to Behavioral Health    4. Type 2 diabetes mellitus with obesity  Overview:  Story: Pt reported diagnosis from prior PCP. Unsure when diagnosed. States it was diet controlled. Diet controlled T2DM. Will continue to monitor.    Orders:  -     Microalbumin / Creatinine Urine Ratio - Urine, Clean Catch  -     CBC & Differential  -     Comprehensive Metabolic Panel  -     Hemoglobin A1c    5. History of Radha-en-Y gastric bypass  Overview:  Sept 2021  [place]    Orders:  -     Vitamin B12  -     Vitamin D,25-Hydroxy    6. Persistent insomnia  Overview:  Currently controlled with Trazodone.     Orders:  -     traZODone (DESYREL) 300 MG tablet; Take 1 tablet by mouth every night at bedtime.  Dispense: 30 tablet; Refill: 2    7. High triglycerides  -     Lipid Panel    8. Adult hypothyroidism  -     TSH    9. Primary osteoarthritis of both knees  -     naproxen (Naprosyn) 500 MG tablet; Take 1 tablet by mouth 2 (Two) Times a Day With Meals.  Dispense: 60 tablet; Refill: 1    10. Encounter for hepatitis C screening test for low risk patient  -     Hepatitis C antibody    11. Screening mammogram for breast cancer  -     Mammo Screening  Digital Tomosynthesis Bilateral With CAD; Future    12. Need for pneumococcal vaccination  -     Pneumococcal Conjugate Vaccine 20-Valent All             Follow Up   Return in about 4 weeks (around 5/12/2025) for Recheck.  Patient was given instructions and counseling regarding her condition or for health maintenance advice. Please see specific information pulled into the AVS if appropriate.     Signed by:    ALEXANDRA Medellin Date: 04/15/25

## 2025-04-14 NOTE — PROGRESS NOTES
April 14, 2025    Hello, may I speak with Angelica Santiago?    My name is ZORAN      I am  with [facility]  [address] [place] [address] [phone].    Before we get started may I verify your date of birth? 1970    I am calling to officially welcome you to our practice and ask about your recent visit. Is this a good time to talk? yes    Tell me about your visit with us. What things went well?  Pt stated she really liked Maverick, he was easy to talk to, informative and he took great care of her for some scheduling she needs.       We're always looking for ways to make our patients' experiences even better. Do you have recommendations on ways we may improve?  no    Overall were you satisfied with your first visit to our practice? yes       I appreciate you taking the time to speak with me today. Is there anything else I can do for you? no      Thank you, and have a great day.

## 2025-04-16 LAB
25(OH)D3+25(OH)D2 SERPL-MCNC: 28.5 NG/ML (ref 30–100)
ALBUMIN SERPL-MCNC: 4.4 G/DL (ref 3.8–4.9)
ALBUMIN/CREAT UR: 4 MG/G CREAT (ref 0–29)
ALP SERPL-CCNC: 96 IU/L (ref 44–121)
ALT SERPL-CCNC: 18 IU/L (ref 0–32)
AST SERPL-CCNC: 21 IU/L (ref 0–40)
BASOPHILS # BLD AUTO: 0.1 X10E3/UL (ref 0–0.2)
BASOPHILS NFR BLD AUTO: 1 %
BILIRUB SERPL-MCNC: 0.3 MG/DL (ref 0–1.2)
BUN SERPL-MCNC: 15 MG/DL (ref 6–24)
BUN/CREAT SERPL: 16 (ref 9–23)
CALCIUM SERPL-MCNC: 9.3 MG/DL (ref 8.7–10.2)
CHLORIDE SERPL-SCNC: 105 MMOL/L (ref 96–106)
CHOLEST SERPL-MCNC: 144 MG/DL (ref 100–199)
CO2 SERPL-SCNC: 21 MMOL/L (ref 20–29)
CREAT SERPL-MCNC: 0.91 MG/DL (ref 0.57–1)
CREAT UR-MCNC: 275.6 MG/DL
EGFRCR SERPLBLD CKD-EPI 2021: 75 ML/MIN/1.73
EOSINOPHIL # BLD AUTO: 0.3 X10E3/UL (ref 0–0.4)
EOSINOPHIL NFR BLD AUTO: 4 %
ERYTHROCYTE [DISTWIDTH] IN BLOOD BY AUTOMATED COUNT: 12.1 % (ref 11.7–15.4)
GLOBULIN SER CALC-MCNC: 2.2 G/DL (ref 1.5–4.5)
GLUCOSE SERPL-MCNC: 94 MG/DL (ref 70–99)
HBA1C MFR BLD: 5.3 % (ref 4.8–5.6)
HCT VFR BLD AUTO: 43.4 % (ref 34–46.6)
HCV IGG SERPL QL IA: NON REACTIVE
HDLC SERPL-MCNC: 48 MG/DL
HGB BLD-MCNC: 14.6 G/DL (ref 11.1–15.9)
IMM GRANULOCYTES # BLD AUTO: 0 X10E3/UL (ref 0–0.1)
IMM GRANULOCYTES NFR BLD AUTO: 0 %
LDLC SERPL CALC-MCNC: 69 MG/DL (ref 0–99)
LYMPHOCYTES # BLD AUTO: 2 X10E3/UL (ref 0.7–3.1)
LYMPHOCYTES NFR BLD AUTO: 29 %
MCH RBC QN AUTO: 31.3 PG (ref 26.6–33)
MCHC RBC AUTO-ENTMCNC: 33.6 G/DL (ref 31.5–35.7)
MCV RBC AUTO: 93 FL (ref 79–97)
MICROALBUMIN UR-MCNC: 11 UG/ML
MONOCYTES # BLD AUTO: 0.5 X10E3/UL (ref 0.1–0.9)
MONOCYTES NFR BLD AUTO: 8 %
NEUTROPHILS # BLD AUTO: 4 X10E3/UL (ref 1.4–7)
NEUTROPHILS NFR BLD AUTO: 58 %
PLATELET # BLD AUTO: 217 X10E3/UL (ref 150–450)
POTASSIUM SERPL-SCNC: 4 MMOL/L (ref 3.5–5.2)
PROT SERPL-MCNC: 6.6 G/DL (ref 6–8.5)
RBC # BLD AUTO: 4.67 X10E6/UL (ref 3.77–5.28)
SODIUM SERPL-SCNC: 143 MMOL/L (ref 134–144)
TRIGL SERPL-MCNC: 155 MG/DL (ref 0–149)
TSH SERPL DL<=0.005 MIU/L-ACNC: 0.8 UIU/ML (ref 0.45–4.5)
VIT B12 SERPL-MCNC: 566 PG/ML (ref 232–1245)
VLDLC SERPL CALC-MCNC: 27 MG/DL (ref 5–40)
WBC # BLD AUTO: 6.8 X10E3/UL (ref 3.4–10.8)

## 2025-04-17 ENCOUNTER — PATIENT ROUNDING (BHMG ONLY) (OUTPATIENT)
Dept: INTERNAL MEDICINE | Facility: CLINIC | Age: 55
End: 2025-04-17
Payer: MEDICAID

## 2025-04-17 NOTE — PROGRESS NOTES
April 17, 2025    Hello, may I speak with Angelica Santiago?    My name is ZORAN      I am  with [facility]  [address] [place] [address] [phone].    Before we get started may I verify your date of birth? 1970    I am calling to officially welcome you to our practice and ask about your recent visit. Is this a good time to talk? YES      Tell me about your visit with us. What things went well?  Pt really liked Maverick.  She stated he was informative and really liked our clinic.       We're always looking for ways to make our patients' experiences even better. Do you have recommendations on ways we may improve?  No you all were great!  Just waiting on Ariana to respond about a medication he didn't fill.    Overall were you satisfied with your first visit to our practice? YES       I appreciate you taking the time to speak with me today. Is there anything else I can do for you? NO      Thank you, and have a great day.

## 2025-04-18 ENCOUNTER — TELEPHONE (OUTPATIENT)
Dept: INTERNAL MEDICINE | Facility: CLINIC | Age: 55
End: 2025-04-18
Payer: MEDICAID

## 2025-04-18 NOTE — TELEPHONE ENCOUNTER
Pt sent picture of  shunt info through [employer] and I faxed over copy to [facility] to see if device is compatible for pt to have MRI at their facility.  Requested for them to let us know if ok to schedule pt there.

## 2025-04-23 LAB
NCCN CRITERIA FLAG: ABNORMAL
TYRER CUZICK SCORE: 15.8

## 2025-04-24 ENCOUNTER — RESULTS FOLLOW-UP (OUTPATIENT)
Facility: HOSPITAL | Age: 55
End: 2025-04-24
Payer: MEDICAID

## 2025-04-24 DIAGNOSIS — Z13.79 GENETIC TESTING: Primary | ICD-10-CM

## 2025-04-28 ENCOUNTER — CLINICAL SUPPORT (OUTPATIENT)
Dept: FAMILY MEDICINE CLINIC | Facility: CLINIC | Age: 55
End: 2025-04-28
Payer: MEDICAID

## 2025-04-28 DIAGNOSIS — Z13.79 GENETIC TESTING: ICD-10-CM

## 2025-04-29 ENCOUNTER — PATIENT MESSAGE (OUTPATIENT)
Dept: FAMILY MEDICINE CLINIC | Facility: CLINIC | Age: 55
End: 2025-04-29
Payer: MEDICAID

## 2025-05-01 DIAGNOSIS — Z13.79 GENETIC TESTING: Primary | ICD-10-CM

## 2025-05-07 ENCOUNTER — PATIENT MESSAGE (OUTPATIENT)
Dept: GENERAL RADIOLOGY | Facility: CLINIC | Age: 55
End: 2025-05-07
Payer: MEDICAID

## 2025-05-12 ENCOUNTER — OFFICE VISIT (OUTPATIENT)
Dept: INTERNAL MEDICINE | Facility: CLINIC | Age: 55
End: 2025-05-12
Payer: MEDICAID

## 2025-05-12 VITALS
RESPIRATION RATE: 16 BRPM | TEMPERATURE: 98.2 F | OXYGEN SATURATION: 98 % | BODY MASS INDEX: 37.16 KG/M2 | WEIGHT: 231.2 LBS | SYSTOLIC BLOOD PRESSURE: 107 MMHG | HEIGHT: 66 IN | DIASTOLIC BLOOD PRESSURE: 69 MMHG | HEART RATE: 61 BPM

## 2025-05-12 DIAGNOSIS — R45.851 SUICIDAL THOUGHTS: ICD-10-CM

## 2025-05-12 DIAGNOSIS — F41.9 ANXIETY: ICD-10-CM

## 2025-05-12 DIAGNOSIS — Z80.0 FAMILY HISTORY OF COLON CANCER REQUIRING SCREENING COLONOSCOPY: ICD-10-CM

## 2025-05-12 DIAGNOSIS — F33.2 SEVERE EPISODE OF RECURRENT MAJOR DEPRESSIVE DISORDER, WITHOUT PSYCHOTIC FEATURES: Primary | ICD-10-CM

## 2025-05-12 RX ORDER — DEXTROMETHORPHAN HYDROBROMIDE, BUPROPION HYDROCHLORIDE 105; 45 MG/1; MG/1
1 TABLET, MULTILAYER, EXTENDED RELEASE ORAL EVERY MORNING
Qty: 30 TABLET | Refills: 0 | COMMUNITY
Start: 2025-05-12

## 2025-05-12 RX ORDER — LATANOPROST 50 UG/ML
SOLUTION/ DROPS OPHTHALMIC
COMMUNITY
Start: 2025-04-23

## 2025-05-12 NOTE — PROGRESS NOTES
Chief Complaint  Headache (Follow up)    Primitivo Santiago presents to [facility]    Check up  Symptoms are: new.   Onset was in the past 7 days.   Symptoms occur: daily.  Symptoms include: nasal congestion, cough, headaches and vertigo.   Pertinent negative symptoms include no abdominal pain, no anorexia, no joint pain, no change in stool, no chest pain, no chills, no diaphoresis, no fatigue, no fever, no joint swelling, no myalgias, no nausea, no neck pain, no numbness, no rash, no sore throat, no swollen glands, no dysuria, no visual change, no vomiting and no weakness.   Treatment and/or Medications comments include: Dylon     Patient is a 54-year-old female presenting today for follow up visit.   She was previously restarted on lexapro for depression and anxiety. She has not noticed much difference. Further reports having not received call concerning therapy.     Interested in skin removal surgery. Previously had bariatric surgery and has lost a significant amount of weight with having initially weighed over 400 pounds. Complains of excess skin around waist being in the way and has frequent yeast infections.       Past Medical History:   Diagnosis Date    Acute respiratory failure 12/04/2014    Bipolar affective disorder, current episode mixed 08/05/2009    Onset Date: 08/05/2009; Story: ASSESSMENT: need to fhave her med refill --- will give for a month and continue with Dr Diallo    Onset Date: 08/05/2009; Story: ASSESSMENT: need to fhave her med refill --- will give for a month and continue with Dr Diallo     Onset Date: 08/05/2009; Story: ASSESSMENT: need to fhave her med refill --- will give for a month and continue with Dr Diallo      Cancer     Brain cancer    Delirium 11/30/2014    Headache     Plantar fasciitis, right 01/07/2020    Story: 2/27/20: PT referral, ice, hard-soled shoes 1/7/20: Right heel pain worst in mornings and with prolonged standing,  classic for plantar fasciitis, most likely d/t habitus. Less likely plantar fibroma, fat pad contusion, achilles tendinits, calcaneal stress fracture or plantar wart. Gave stretches to do, counseled use of ice, NSAIDs, stretching. If no improvement in 4-6 weeks, will give rx fo    Type 2 diabetes mellitus with obesity 2016    Story: Pt reported diagnosis from prior PCP. Unsure when diagnosed. States it was diet controlled. Does not remember information from previous PCP so unable to request records 2019 A1c: 5.7; Impression Recorded 2020: Diet controlled T2DM. Will continue to monitor -Recommend increasing activity through water based activity, discussed how this will elp continue to control DM and also help      Past Surgical History:   Procedure Laterality Date    BRAIN SURGERY      craniotomy for WHO grade 2 left cavernous sinus meningioma in  by Dr. Rose    GASTRIC BYPASS      SHOULDER SURGERY      VENTRICULOPERITONEAL SHUNT Right     Current setting 2.5 cm H2O     Social History     Socioeconomic History    Marital status: Single   Tobacco Use    Smoking status: Former     Current packs/day: 0.00     Average packs/day: 1 pack/day for 10.0 years (10.0 ttl pk-yrs)     Types: Cigarettes     Start date:      Quit date:      Years since quittin.3     Passive exposure: Past    Smokeless tobacco: Never   Vaping Use    Vaping status: Never Used   Substance and Sexual Activity    Alcohol use: Yes    Drug use: Yes     Types: Marijuana    Sexual activity: Defer     Family History   Problem Relation Age of Onset    Breast cancer Mother     Cancer Mother     No Known Problems Father     Cancer Brother        Medications:  Current Outpatient Medications   Medication Sig Dispense Refill    latanoprost (XALATAN) 0.005 % ophthalmic solution place one drop into both eyes at bedtime      multivitamin with minerals (MULTIVITAMIN ADULT PO) Take 1 tablet by mouth Daily.      naproxen (Naprosyn)  "500 MG tablet Take 1 tablet by mouth 2 (Two) Times a Day With Meals. 60 tablet 1    traZODone (DESYREL) 300 MG tablet Take 1 tablet by mouth every night at bedtime. 30 tablet 2    Dextromethorphan-buPROPion ER (Auvelity)  MG tablet controlled-release Take 1 tablet by mouth Every Morning. 30 tablet 0    levETIRAcetam (KEPPRA) 1000 MG tablet Take 1 tablet by mouth. (Patient not taking: Reported on 5/12/2025)      vitamin B-12 (cyanocobalamin) 100 MCG tablet Take 5 tablets by mouth Daily. (Patient not taking: Reported on 5/12/2025)       No current facility-administered medications for this visit.       Review of Systems   Constitutional:  Negative for chills, diaphoresis, fatigue and fever.   HENT:  Positive for congestion. Negative for sore throat and swollen glands.    Respiratory:  Positive for cough.    Cardiovascular:  Negative for chest pain.   Gastrointestinal:  Negative for abdominal pain, anorexia, nausea and vomiting.   Genitourinary:  Negative for dysuria.   Musculoskeletal:  Negative for joint pain, myalgias and neck pain.   Skin:  Negative for rash.   Neurological:  Positive for vertigo. Negative for weakness and numbness.   Review of systems is negative unless otherwise specified in HPI.      Objective   Vital Signs:  /69 (BP Location: Left arm, Patient Position: Sitting, Cuff Size: Adult)   Pulse 61   Temp 98.2 °F (36.8 °C) (Infrared)   Resp 16   Ht 167.6 cm (66\")   Wt 105 kg (231 lb 3.2 oz)   SpO2 98%   BMI 37.32 kg/m²   Estimated body mass index is 37.32 kg/m² as calculated from the following:    Height as of this encounter: 167.6 cm (66\").    Weight as of this encounter: 105 kg (231 lb 3.2 oz).          PHQ-9 Depression Screening  Little interest or pleasure in doing things? Almost all   Feeling down, depressed, or hopeless? Almost all   PHQ-2 Total Score 6   Trouble falling or staying asleep, or sleeping too much? Not at all   Feeling tired or having little energy? Almost all "   Poor appetite or overeating? Almost all   Feeling bad about yourself - or that you are a failure or have let yourself or your family down? Almost all   Trouble concentrating on things, such as reading the newspaper or watching television? Almost all   Moving or speaking so slowly that other people could have noticed? Or the opposite - being so fidgety or restless that you have been moving around a lot more than usual? Not at all   Thoughts that you would be better off dead, or of hurting yourself in some way? Almost all (past week)   PHQ-9 Total Score 21   If you checked off any problems, how difficult have these problems made it for you to do your work, take care of things at home, or get along with other people? Somewhat difficult        LIZZIE-7: Over the last two weeks, how often have you been bothered by the following problems?  Feeling nervous, anxious or on edge: Nearly every day  Not being able to stop or control worrying: Nearly every day  Worrying too much about different things: Nearly every day  Trouble Relaxing: Nearly every day  Being so restless that it is hard to sit still: Not at all  Becoming easily annoyed or irritable: Nearly every day  Feeling afraid as if something awful might happen: Several days  LIZZIE 7 Total Score: 16  If you checked any problems, how difficult have these problems made it for you to do your work, take care of things at home, or get along with other people: Very difficult      Physical Exam  Vitals and nursing note reviewed.   Constitutional:       General: She is not in acute distress.     Appearance: She is obese. She is not ill-appearing.   HENT:      Head: Normocephalic.      Nose: Nose normal.      Mouth/Throat:      Mouth: Mucous membranes are moist.   Eyes:      Pupils: Pupils are equal, round, and reactive to light.   Cardiovascular:      Rate and Rhythm: Normal rate and regular rhythm.      Pulses: Normal pulses.      Heart sounds: Normal heart sounds.   Pulmonary:       Effort: Pulmonary effort is normal. No respiratory distress.      Breath sounds: Normal breath sounds.   Musculoskeletal:         General: Normal range of motion.      Cervical back: Normal range of motion.   Skin:     General: Skin is warm and dry.   Neurological:      General: No focal deficit present.      Mental Status: She is alert.   Psychiatric:         Mood and Affect: Mood is anxious and depressed.         Speech: Speech normal.         Behavior: Behavior is cooperative.         Thought Content: Thought content does not include homicidal or suicidal ideation. Thought content does not include homicidal or suicidal plan.          Result Review :                 Assessment and Plan   Diagnoses and all orders for this visit:    1. Severe episode of recurrent major depressive disorder, without psychotic features (Primary)  -     Dextromethorphan-buPROPion ER (Auvelity)  MG tablet controlled-release; Take 1 tablet by mouth Every Morning.  Dispense: 30 tablet; Refill: 0    2. Anxiety  -     Dextromethorphan-buPROPion ER (Auvelity)  MG tablet controlled-release; Take 1 tablet by mouth Every Morning.  Dispense: 30 tablet; Refill: 0    3. Suicidal thoughts  -     Dextromethorphan-buPROPion ER (Auvelity)  MG tablet controlled-release; Take 1 tablet by mouth Every Morning.  Dispense: 30 tablet; Refill: 0    4. Family history of colon cancer requiring screening colonoscopy  -     Ambulatory Referral to Gastroenterology      - Depression and anxiety is worsening since starting Lexapro.  Patient to stop taking Lexapro.  Will start on Auvelity with patient experiencing suicidal thoughts.  She denies any current plan or intent to harm or kill herself.  Safety planning completed with patient in the office today.  Patient was provided suicide prevention  number she can call or text as well as crisis line that she can text if needed.           Follow Up   Return in about 16 days (around 5/28/2025) for follow  up on anxiety and depression..  Patient was given instructions and counseling regarding her condition or for health maintenance advice. Please see specific information pulled into the AVS if appropriate.     Signed by:    ALEXANDRA Medellin Date: 05/16/25

## 2025-05-16 ENCOUNTER — HOSPITAL ENCOUNTER (OUTPATIENT)
Dept: MRI IMAGING | Facility: HOSPITAL | Age: 55
Discharge: HOME | End: 2025-05-16
Payer: MEDICAID

## 2025-05-16 ENCOUNTER — HOSPITAL ENCOUNTER (OUTPATIENT)
Dept: GENERAL RADIOLOGY | Facility: HOSPITAL | Age: 55
Discharge: HOME | End: 2025-05-16
Payer: MEDICAID

## 2025-05-16 DIAGNOSIS — D32.9 MENINGIOMA: ICD-10-CM

## 2025-05-16 DIAGNOSIS — Z98.2 S/P VP SHUNT: ICD-10-CM

## 2025-05-16 DIAGNOSIS — Z98.890 HISTORY OF CRANIOTOMY: ICD-10-CM

## 2025-05-16 DIAGNOSIS — Z53.09 MRI CONTRAINDICATED DUE TO METAL IMPLANT: ICD-10-CM

## 2025-05-16 PROCEDURE — 25510000001 GADOPICLENOL 0.5 MMOL/ML SOLUTION: Performed by: NURSE PRACTITIONER

## 2025-05-16 PROCEDURE — 70250 X-RAY EXAM OF SKULL: CPT

## 2025-05-16 PROCEDURE — 70553 MRI BRAIN STEM W/O & W/DYE: CPT

## 2025-05-16 PROCEDURE — A9573 GADOPICLENOL 0.5 MMOL/ML SOLUTION: HCPCS | Performed by: NURSE PRACTITIONER

## 2025-05-16 RX ADMIN — GADOPICLENOL 10 ML: 485.1 INJECTION INTRAVENOUS at 14:46

## 2025-05-16 NOTE — PROGRESS NOTES
SHUNT ADJUSTMENT PROCEDURE:     shunt evaluation status post MRI brain.  Last encounter 1/10/2024,  shunt noted to be at 2.5 cm H2O.  Received a message from radiologist, Dr. George of  shunt change post MRI.    Diagnosis:  Encounter for readjustment and management of a cerebrospinal fluid drainage device  Presence of  shunt  Status post craniotomy  WHO grade II meningioma     Ms. Dausey  shunt is palpable to right parietal scalp.  Shunt pumps and fills well and no discontinuity.  This  shunt adjustment was performed using the [employer] Valve adjustment tool.  The  tool was placed above the valve with a opening encompassing the valve mechanism and the blue arrow pointing towards the distal tubing.  The indicator tool (Compass) was placed into the  tool while aligning the red bands on the tool.  The current performance level setting was confirmed at 0.5.  The  tool was held in position while removing the indicator tool.  The adjustment tool was then placed into the  tool with the blue triangle pointed at the current performance level setting.  By turning the adjustment total, the performance level setting was adjusted to 2.5.  The  tool was again held in place while removing the adjustment tool.  The indicator tool was placed back into the  tool, again aligning the red bands on the tool, confirming new performance level setting at 2.5.    ALEXANDRA Naranjo 5/16/2025 16:01 [facility]      [id number]  [id number]

## 2025-05-23 DIAGNOSIS — D32.9 MENINGIOMA: Primary | ICD-10-CM

## 2025-05-23 DIAGNOSIS — Z98.890 HISTORY OF CRANIOTOMY: ICD-10-CM

## 2025-05-27 ENCOUNTER — DOCUMENTATION (OUTPATIENT)
Dept: NEUROSURGERY | Facility: CLINIC | Age: 55
End: 2025-05-27
Payer: MEDICAID

## 2025-05-27 LAB
AMBRY INTERPRETATION REPORT: NORMAL
GENE DIS ANL INTERP-IMP: NORMAL

## 2025-05-28 ENCOUNTER — OFFICE VISIT (OUTPATIENT)
Dept: INTERNAL MEDICINE | Facility: CLINIC | Age: 55
End: 2025-05-28
Payer: MEDICAID

## 2025-05-28 VITALS
SYSTOLIC BLOOD PRESSURE: 112 MMHG | RESPIRATION RATE: 16 BRPM | BODY MASS INDEX: 37 KG/M2 | WEIGHT: 230.2 LBS | HEART RATE: 65 BPM | OXYGEN SATURATION: 98 % | HEIGHT: 66 IN | DIASTOLIC BLOOD PRESSURE: 77 MMHG | TEMPERATURE: 97.5 F

## 2025-05-28 DIAGNOSIS — F33.2 SEVERE EPISODE OF RECURRENT MAJOR DEPRESSIVE DISORDER, WITHOUT PSYCHOTIC FEATURES: ICD-10-CM

## 2025-05-28 DIAGNOSIS — M25.562 PAIN AND SWELLING OF LEFT KNEE: ICD-10-CM

## 2025-05-28 DIAGNOSIS — F41.9 ANXIETY: Primary | ICD-10-CM

## 2025-05-28 DIAGNOSIS — Z86.59 HISTORY OF SUICIDAL IDEATION: ICD-10-CM

## 2025-05-28 DIAGNOSIS — M25.462 PAIN AND SWELLING OF LEFT KNEE: ICD-10-CM

## 2025-05-28 RX ORDER — DEXTROMETHORPHAN HYDROBROMIDE, BUPROPION HYDROCHLORIDE 105; 45 MG/1; MG/1
1 TABLET, MULTILAYER, EXTENDED RELEASE ORAL EVERY MORNING
Qty: 30 TABLET | Refills: 0 | Status: SHIPPED | OUTPATIENT
Start: 2025-05-28

## 2025-05-28 RX ORDER — HYDROCODONE BITARTRATE AND ACETAMINOPHEN 5; 325 MG/1; MG/1
1 TABLET ORAL EVERY 6 HOURS PRN
Qty: 12 TABLET | Refills: 0 | Status: SHIPPED | OUTPATIENT
Start: 2025-05-28

## 2025-05-28 NOTE — PROGRESS NOTES
Chief Complaint  Anxiety (Follow up), Depression (Follow up), and Knee Pain (Left knee pain due to a fall about 2 weeks ago, went to [facility], xrays were negative)    Primitivo Santiago presents to [facility]  Anxiety  Initial visit:     PMH includes: depression    Depression    Nighttime awakenings:     PMH Includes: depression    Knee Pain   The incident occurred more than 1 week ago. Incident location: Fell at [facility]. The injury mechanism was a fall. The pain is present in the left knee. The quality of the pain is described as aching and stabbing. The pain is at a severity of 8/10. The pain has been Constant since onset. Associated symptoms include an inability to bear weight. Pertinent negatives include no numbness or tingling. She reports no foreign bodies present. The symptoms are aggravated by movement, palpation and weight bearing. She has tried acetaminophen for the symptoms. The treatment provided mild relief.     Patient is a 54-year-old female presenting today for follow up visit.   She reports her anxiety and depression have improved. Has noticed since starting Auvelity she has not been crying. Previously tried and failed sertraline, lexapro, wellbutrin, and several others she does not remember. Would like to continue Auvelity with how much better she has felt.     She also has complaints today of left knee pain. Describes having fallen 2 weeks ago landing on her left knee. Reports having broken her glasses in the fall. She had called EMS and went to [facility]. She was told xrays were negative for fracture. Pain has been unchanged. Pain is aggravated with movement, straightening, walking, and standing. She has been taking tylenol and the last of a few oxycodone she had from a previous injury with minimal relief.       Past Medical History:   Diagnosis Date    Acute respiratory failure 12/04/2014    Bipolar affective disorder, current episode mixed  2009    Onset Date: 2009; Story: ASSESSMENT: need to fhave her med refill --- will give for a month and continue with Dr Diallo    Onset Date: 2009; Story: ASSESSMENT: need to fhave her med refill --- will give for a month and continue with Dr Diallo     Onset Date: 2009; Story: ASSESSMENT: need to fhave her med refill --- will give for a month and continue with Dr Diallo      Cancer     Brain cancer    Delirium 2014    Headache     Plantar fasciitis, right 2020    Story: 20: PT referral, ice, hard-soled shoes 20: Right heel pain worst in mornings and with prolonged standing, classic for plantar fasciitis, most likely d/t habitus. Less likely plantar fibroma, fat pad contusion, achilles tendinits, calcaneal stress fracture or plantar wart. Gave stretches to do, counseled use of ice, NSAIDs, stretching. If no improvement in 4-6 weeks, will give rx fo    Type 2 diabetes mellitus with obesity 2016    Story: Pt reported diagnosis from prior PCP. Unsure when diagnosed. States it was diet controlled. Does not remember information from previous PCP so unable to request records 2019 A1c: 5.7; Impression Recorded 2020: Diet controlled T2DM. Will continue to monitor -Recommend increasing activity through water based activity, discussed how this will elp continue to control DM and also help      Past Surgical History:   Procedure Laterality Date    BRAIN SURGERY      craniotomy for WHO grade 2 left cavernous sinus meningioma in  by Dr. Rose    GASTRIC BYPASS      SHOULDER SURGERY      VENTRICULOPERITONEAL SHUNT Right     Current setting 2.5 cm H2O     Social History     Socioeconomic History    Marital status: Single   Tobacco Use    Smoking status: Former     Current packs/day: 0.00     Average packs/day: 1 pack/day for 10.0 years (10.0 ttl pk-yrs)     Types: Cigarettes     Start date: 1985     Quit date:      Years since quittin.4      "Passive exposure: Past    Smokeless tobacco: Never   Vaping Use    Vaping status: Never Used   Substance and Sexual Activity    Alcohol use: Yes    Drug use: Yes     Types: Marijuana    Sexual activity: Not Currently     Partners: Male     Family History   Problem Relation Age of Onset    Breast cancer Mother     Cancer Mother     No Known Problems Father     Cancer Brother        Medications:  Current Outpatient Medications   Medication Sig Dispense Refill    Dextromethorphan-buPROPion ER (Auvelity)  MG tablet controlled-release Take 1 tablet by mouth Every Morning. 30 tablet 0    latanoprost (XALATAN) 0.005 % ophthalmic solution place one drop into both eyes at bedtime      multivitamin with minerals (MULTIVITAMIN ADULT PO) Take 1 tablet by mouth Daily.      naproxen (Naprosyn) 500 MG tablet Take 1 tablet by mouth 2 (Two) Times a Day With Meals. 60 tablet 1    traZODone (DESYREL) 300 MG tablet Take 1 tablet by mouth every night at bedtime. 30 tablet 2    HYDROcodone-acetaminophen (NORCO) 5-325 MG per tablet Take 1 tablet by mouth Every 6 (Six) Hours As Needed for Moderate Pain or Severe Pain. 12 tablet 0    levETIRAcetam (KEPPRA) 1000 MG tablet Take 1 tablet by mouth. (Patient not taking: Reported on 4/14/2025)      vitamin B-12 (cyanocobalamin) 100 MCG tablet Take 5 tablets by mouth Daily. (Patient not taking: Reported on 4/14/2025)       No current facility-administered medications for this visit.       Review of Systems   Neurological:  Negative for tingling and numbness.   Review of systems is negative unless otherwise specified in HPI.      Objective   Vital Signs:  /77 (BP Location: Left arm, Patient Position: Sitting, Cuff Size: Adult)   Pulse 65   Temp 97.5 °F (36.4 °C) (Infrared)   Resp 16   Ht 167.6 cm (66\")   Wt 104 kg (230 lb 3.2 oz)   SpO2 98%   BMI 37.16 kg/m²   Estimated body mass index is 37.16 kg/m² as calculated from the following:    Height as of this encounter: 167.6 cm " "(66\").    Weight as of this encounter: 104 kg (230 lb 3.2 oz).            PHQ-9 Depression Screening  Little interest or pleasure in doing things? Over half   Feeling down, depressed, or hopeless? Not at all   PHQ-2 Total Score 2   Trouble falling or staying asleep, or sleeping too much? Several days   Feeling tired or having little energy? Almost all   Poor appetite or overeating? Almost all (history of gastric surgery)   Feeling bad about yourself - or that you are a failure or have let yourself or your family down? Not at all   Trouble concentrating on things, such as reading the newspaper or watching television? Almost all   Moving or speaking so slowly that other people could have noticed? Or the opposite - being so fidgety or restless that you have been moving around a lot more than usual? Not at all   Thoughts that you would be better off dead, or of hurting yourself in some way? Not at all   PHQ-9 Total Score 12   If you checked off any problems, how difficult have these problems made it for you to do your work, take care of things at home, or get along with other people? Somewhat difficult        LIZZIE-7: Over the last two weeks, how often have you been bothered by the following problems?  Feeling nervous, anxious or on edge: Several days  Not being able to stop or control worrying: Nearly every day  Worrying too much about different things: Several days  Trouble Relaxing: Not at all  Being so restless that it is hard to sit still: Nearly every day  Becoming easily annoyed or irritable: Not at all  Feeling afraid as if something awful might happen: Not at all  LIZZIE 7 Total Score: 8  If you checked any problems, how difficult have these problems made it for you to do your work, take care of things at home, or get along with other people: Somewhat difficult      Physical Exam  Vitals and nursing note reviewed.   Constitutional:       General: She is not in acute distress.     Appearance: She is obese. She is not " ill-appearing.   HENT:      Head: Normocephalic.      Nose: Nose normal.      Mouth/Throat:      Mouth: Mucous membranes are moist.   Eyes:      Pupils: Pupils are equal, round, and reactive to light.   Cardiovascular:      Rate and Rhythm: Normal rate and regular rhythm.      Pulses: Normal pulses.      Heart sounds: Normal heart sounds.   Pulmonary:      Effort: Pulmonary effort is normal. No respiratory distress.      Breath sounds: Normal breath sounds.   Abdominal:      General: Bowel sounds are normal.      Palpations: Abdomen is soft.   Musculoskeletal:      Cervical back: Normal range of motion.      Right knee: Normal.      Left knee: Swelling present. Decreased range of motion. Tenderness present over the lateral joint line and PCL. No patellar tendon tenderness. No LCL laxity or MCL laxity.  Skin:     General: Skin is warm and dry.      Capillary Refill: Capillary refill takes less than 2 seconds.   Neurological:      General: No focal deficit present.      Mental Status: She is alert.          Result Review :                 Assessment and Plan   Diagnoses and all orders for this visit:    1. Anxiety (Primary)  -     Dextromethorphan-buPROPion ER (Auvelity)  MG tablet controlled-release; Take 1 tablet by mouth Every Morning.  Dispense: 30 tablet; Refill: 0    2. Severe episode of recurrent major depressive disorder, without psychotic features  -     Dextromethorphan-buPROPion ER (Auvelity)  MG tablet controlled-release; Take 1 tablet by mouth Every Morning.  Dispense: 30 tablet; Refill: 0    3. History of suicidal ideation  -     Dextromethorphan-buPROPion ER (Auvelity)  MG tablet controlled-release; Take 1 tablet by mouth Every Morning.  Dispense: 30 tablet; Refill: 0    4. Pain and swelling of left knee  -     Ambulatory Referral to Orthopedic Surgery  -     HYDROcodone-acetaminophen (NORCO) 5-325 MG per tablet; Take 1 tablet by mouth Every 6 (Six) Hours As Needed for Moderate Pain or  Severe Pain.  Dispense: 12 tablet; Refill: 0  -     XR Knee 1 or 2 View Left (In Office)      - Anxiety and depression are much improved. Will continue Auvelity daily.   - Pain and swelling of left knee is a new problem following a fall. Will repeat xray in office and provide 3 day supply of pain medication for use as needed. Encouraged rest, ice, compression, and elevation. Discussed differential to include tendon/ligament injury and will proceed with orthopedic referral.            Follow Up   Return in about 4 weeks (around 6/25/2025).  Patient was given instructions and counseling regarding her condition or for health maintenance advice. Please see specific information pulled into the AVS if appropriate.     Signed by:    ALEXANDRA Medellin Date: 05/31/25

## 2025-05-29 ENCOUNTER — PRIOR AUTHORIZATION (OUTPATIENT)
Dept: INTERNAL MEDICINE | Facility: CLINIC | Age: 55
End: 2025-05-29
Payer: MEDICAID

## 2025-05-29 NOTE — TELEPHONE ENCOUNTER
ROSA ELENA VARGAS (Key: [id number])  Rx #: [id number]  Auvelity 45-105MG er tablets  Message from Plan  The request has been approved. The authorization is effective from 05/29/2025 to 05/29/2026, as long as the member is enrolled in their current health plan. A written notification letter will follow with additional details.. Authorization Expiration Date: May 29, 2026.

## 2025-06-13 ENCOUNTER — RESULTS FOLLOW-UP (OUTPATIENT)
Dept: NEUROSURGERY | Facility: CLINIC | Age: 55
End: 2025-06-13
Payer: MEDICAID

## 2025-06-13 NOTE — PROGRESS NOTES
MRI appears stable from 2023.  Follow-up 1 year with repeat MRI of the brain with and without contrast.  Will follow her yearly.

## 2025-06-14 DIAGNOSIS — M17.0 PRIMARY OSTEOARTHRITIS OF BOTH KNEES: ICD-10-CM

## 2025-06-16 RX ORDER — NAPROXEN 500 MG/1
500 TABLET ORAL 2 TIMES DAILY WITH MEALS
Qty: 60 TABLET | Refills: 1 | Status: SHIPPED | OUTPATIENT
Start: 2025-06-16

## 2025-06-16 NOTE — TELEPHONE ENCOUNTER
Rx Refill Note  Requested Prescriptions     Pending Prescriptions Disp Refills    naproxen (NAPROSYN) 500 MG tablet [Pharmacy Med Name: naproxen 500 mg tablet] 60 tablet 1     Sig: TAKE ONE TABLET BY MOUTH TWICE DAILY WITH MEALS      Last office visit with prescribing clinician: 5/28/2025   Last telemedicine visit with prescribing clinician: Visit date not found   Next office visit with prescribing clinician: 6/27/2025                         Would you like a call back once the refill request has been completed: [] Yes [] No    If the office needs to give you a call back, can they leave a voicemail: [] Yes [] No    Ariana Lazcano MA  06/16/25, 08:31 CDT

## 2025-06-18 DIAGNOSIS — D32.9 MENINGIOMA: Primary | ICD-10-CM

## 2025-06-18 DIAGNOSIS — Z98.890 HISTORY OF CRANIOTOMY: ICD-10-CM

## 2025-06-27 ENCOUNTER — OFFICE VISIT (OUTPATIENT)
Dept: INTERNAL MEDICINE | Facility: CLINIC | Age: 55
End: 2025-06-27
Payer: MEDICAID

## 2025-06-27 VITALS
TEMPERATURE: 98.2 F | WEIGHT: 226.6 LBS | HEART RATE: 88 BPM | HEIGHT: 66 IN | DIASTOLIC BLOOD PRESSURE: 70 MMHG | OXYGEN SATURATION: 97 % | SYSTOLIC BLOOD PRESSURE: 102 MMHG | BODY MASS INDEX: 36.42 KG/M2 | RESPIRATION RATE: 16 BRPM

## 2025-06-27 DIAGNOSIS — M17.0 PRIMARY OSTEOARTHRITIS OF BOTH KNEES: ICD-10-CM

## 2025-06-27 DIAGNOSIS — G47.00 PERSISTENT INSOMNIA: ICD-10-CM

## 2025-06-27 DIAGNOSIS — F41.9 ANXIETY: ICD-10-CM

## 2025-06-27 DIAGNOSIS — F33.40 RECURRENT MAJOR DEPRESSIVE DISORDER, IN REMISSION: Primary | ICD-10-CM

## 2025-06-27 DIAGNOSIS — Z86.59 HISTORY OF SUICIDAL IDEATION: ICD-10-CM

## 2025-06-27 PROCEDURE — 99214 OFFICE O/P EST MOD 30 MIN: CPT

## 2025-06-27 PROCEDURE — 1160F RVW MEDS BY RX/DR IN RCRD: CPT

## 2025-06-27 PROCEDURE — 3044F HG A1C LEVEL LT 7.0%: CPT

## 2025-06-27 PROCEDURE — 1159F MED LIST DOCD IN RCRD: CPT

## 2025-06-27 PROCEDURE — 1126F AMNT PAIN NOTED NONE PRSNT: CPT

## 2025-06-27 RX ORDER — DEXTROMETHORPHAN HYDROBROMIDE, BUPROPION HYDROCHLORIDE 105; 45 MG/1; MG/1
1 TABLET, MULTILAYER, EXTENDED RELEASE ORAL EVERY MORNING
Qty: 90 TABLET | Refills: 1 | Status: SHIPPED | OUTPATIENT
Start: 2025-06-27

## 2025-06-27 RX ORDER — NAPROXEN 500 MG/1
500 TABLET ORAL 2 TIMES DAILY WITH MEALS
Qty: 180 TABLET | Refills: 0 | Status: SHIPPED | OUTPATIENT
Start: 2025-06-27

## 2025-06-27 RX ORDER — TRAZODONE HYDROCHLORIDE 300 MG/1
300 TABLET ORAL
Qty: 90 TABLET | Refills: 1 | Status: SHIPPED | OUTPATIENT
Start: 2025-06-27

## 2025-06-27 NOTE — PROGRESS NOTES
Chief Complaint  Anxiety    Subjective        Angelica Santiago presents to [facility]  History of Present Illness  Patient is a 54-year-old female presenting today to for follow up visit.  She reports everything has been going great. Has been taking Auvelity with no issues. nxiety and depression are reported to be well controlled with no suicidal thoughts. She reports having left her recent boyfriend and kicked him out of the apartment with movement in her mood.  Further reports will be moving soon to [place] with a previous girlfriend that she has reunited with.    She has been taking trazodone and is able to fall asleep and stay asleep without issue.       Past Medical History:   Diagnosis Date    Acute respiratory failure 12/04/2014    Bipolar affective disorder, current episode mixed 08/05/2009    Onset Date: 08/05/2009; Story: ASSESSMENT: need to fhave her med refill --- will give for a month and continue with Dr Diallo    Onset Date: 08/05/2009; Story: ASSESSMENT: need to fhave her med refill --- will give for a month and continue with Dr Diallo     Onset Date: 08/05/2009; Story: ASSESSMENT: need to fhave her med refill --- will give for a month and continue with Dr Diallo      Cancer     Brain cancer    Delirium 11/30/2014    Headache     Plantar fasciitis, right 01/07/2020    Story: 2/27/20: PT referral, ice, hard-soled shoes 1/7/20: Right heel pain worst in mornings and with prolonged standing, classic for plantar fasciitis, most likely d/t habitus. Less likely plantar fibroma, fat pad contusion, achilles tendinits, calcaneal stress fracture or plantar wart. Gave stretches to do, counseled use of ice, NSAIDs, stretching. If no improvement in 4-6 weeks, will give rx fo    Type 2 diabetes mellitus with obesity 09/30/2016    Story: Pt reported diagnosis from prior PCP. Unsure when diagnosed. States it was diet controlled. Does not remember information from previous PCP so unable to  request records 2019 A1c: 5.7; Impression Recorded 2020: Diet controlled T2DM. Will continue to monitor -Recommend increasing activity through water based activity, discussed how this will elp continue to control DM and also help      Past Surgical History:   Procedure Laterality Date    BRAIN SURGERY      craniotomy for WHO grade 2 left cavernous sinus meningioma in  by Dr. Rose    GASTRIC BYPASS      SHOULDER SURGERY      VENTRICULOPERITONEAL SHUNT Right     Current setting 2.5 cm H2O     Social History     Socioeconomic History    Marital status: Single   Tobacco Use    Smoking status: Former     Current packs/day: 0.00     Average packs/day: 1 pack/day for 10.0 years (10.0 ttl pk-yrs)     Types: Cigarettes     Start date: 1985     Quit date:      Years since quittin.5     Passive exposure: Past    Smokeless tobacco: Never   Vaping Use    Vaping status: Never Used   Substance and Sexual Activity    Alcohol use: Yes    Drug use: Yes     Types: Marijuana    Sexual activity: Not Currently     Partners: Male     Family History   Problem Relation Age of Onset    Breast cancer Mother     Cancer Mother     No Known Problems Father     Cancer Brother        Medications:  Current Outpatient Medications   Medication Sig Dispense Refill    Dextromethorphan-buPROPion ER (Auvelity)  MG tablet controlled-release Take 1 tablet by mouth Every Morning. 90 tablet 1    HYDROcodone-acetaminophen (NORCO) 5-325 MG per tablet Take 1 tablet by mouth Every 6 (Six) Hours As Needed for Moderate Pain or Severe Pain. 12 tablet 0    latanoprost (XALATAN) 0.005 % ophthalmic solution place one drop into both eyes at bedtime      multivitamin with minerals (MULTIVITAMIN ADULT PO) Take 1 tablet by mouth Daily.      naproxen (NAPROSYN) 500 MG tablet Take 1 tablet by mouth 2 (Two) Times a Day With Meals. 180 tablet 0    traZODone (DESYREL) 300 MG tablet Take 1 tablet by mouth every night at bedtime. 90 tablet 1  "   levETIRAcetam (KEPPRA) 1000 MG tablet Take 1 tablet by mouth. (Patient not taking: Reported on 6/27/2025)       No current facility-administered medications for this visit.       Review of Systems  Review of systems is negative unless otherwise specified in HPI.    Objective   Vital Signs:  /70 (BP Location: Left arm, Patient Position: Sitting, Cuff Size: Adult)   Pulse 88   Temp 98.2 °F (36.8 °C) (Infrared)   Resp 16   Ht 167.6 cm (66\")   Wt 103 kg (226 lb 9.6 oz)   SpO2 97%   BMI 36.57 kg/m²   Estimated body mass index is 36.57 kg/m² as calculated from the following:    Height as of this encounter: 167.6 cm (66\").    Weight as of this encounter: 103 kg (226 lb 9.6 oz).          PHQ-9 Depression Screening  Little interest or pleasure in doing things? Not at all   Feeling down, depressed, or hopeless? Not at all   PHQ-2 Total Score 0   Trouble falling or staying asleep, or sleeping too much?     Feeling tired or having little energy?     Poor appetite or overeating?     Feeling bad about yourself - or that you are a failure or have let yourself or your family down?     Trouble concentrating on things, such as reading the newspaper or watching television?     Moving or speaking so slowly that other people could have noticed? Or the opposite - being so fidgety or restless that you have been moving around a lot more than usual?     Thoughts that you would be better off dead, or of hurting yourself in some way?     PHQ-9 Total Score     If you checked off any problems, how difficult have these problems made it for you to do your work, take care of things at home, or get along with other people?          LIZZIE-7: Over the last two weeks, how often have you been bothered by the following problems?  Feeling nervous, anxious or on edge: Not at all  Not being able to stop or control worrying: Not at all  Worrying too much about different things: Not at all  Trouble Relaxing: Not at all  Being so restless that " it is hard to sit still: Several days  Becoming easily annoyed or irritable: Not at all  Feeling afraid as if something awful might happen: Not at all  LIZZIE 7 Total Score: 1  If you checked any problems, how difficult have these problems made it for you to do your work, take care of things at home, or get along with other people: Not difficult at all      Physical Exam  Vitals and nursing note reviewed.   Constitutional:       General: She is not in acute distress.     Appearance: She is obese. She is not ill-appearing.   HENT:      Head: Normocephalic.      Nose: Nose normal.      Mouth/Throat:      Mouth: Mucous membranes are moist.      Pharynx: Oropharynx is clear.   Cardiovascular:      Rate and Rhythm: Normal rate and regular rhythm.      Pulses: Normal pulses.      Heart sounds: Normal heart sounds.   Pulmonary:      Effort: Pulmonary effort is normal. No respiratory distress.      Breath sounds: Normal breath sounds.   Musculoskeletal:         General: Normal range of motion.      Cervical back: Normal range of motion.   Skin:     General: Skin is warm and dry.   Neurological:      General: No focal deficit present.      Mental Status: She is alert.          Result Review :  The following data was reviewed by: ALEXANDRA Medellin on 06/27/2025:  CMP          4/14/2025    13:55   CMP   Glucose 94    BUN 15    Creatinine 0.91    EGFR 75    Sodium 143    Potassium 4.0    Chloride 105    Calcium 9.3    Total Protein 6.6    Albumin 4.4    Globulin 2.2    Total Bilirubin 0.3    Alkaline Phosphatase 96    AST (SGOT) 21    ALT (SGPT) 18    BUN/Creatinine Ratio 16      CBC w/diff          7/27/2024    12:41 4/14/2025    13:55   CBC w/Diff   WBC 6     6.8    RBC 4.65     4.67    Hemoglobin 14.1     14.6    Hematocrit 42.6     43.4    MCV 91.6     93    MCH 30.3     31.3    MCHC 33.1     33.6    RDW 12     12.1    Platelets 193     217    Neutrophil Rel % 60.9     58    Lymphocyte Rel % 28.6     29    Monocyte Rel % 8      8    Eosinophil Rel % 1.5     4    Basophil Rel % 0.8     1       Details          This result is from an external source.             Lipid Panel          4/14/2025    13:55   Lipid Panel   Total Cholesterol 144    Triglycerides 155    HDL Cholesterol 48    VLDL Cholesterol 27    LDL Cholesterol  69      TSH          4/14/2025    13:55   TSH   TSH 0.798      A1C Last 3 Results          4/14/2025    13:55   HGBA1C Last 3 Results   Hemoglobin A1C 5.3             Assessment and Plan   Diagnoses and all orders for this visit:    1. Recurrent major depressive disorder, in remission (Primary)  -     Dextromethorphan-buPROPion ER (Auvelity)  MG tablet controlled-release; Take 1 tablet by mouth Every Morning.  Dispense: 90 tablet; Refill: 1    2. Anxiety  -     Dextromethorphan-buPROPion ER (Auvelity)  MG tablet controlled-release; Take 1 tablet by mouth Every Morning.  Dispense: 90 tablet; Refill: 1    3. Persistent insomnia  Overview:  Currently controlled with Trazodone.     Orders:  -     traZODone (DESYREL) 300 MG tablet; Take 1 tablet by mouth every night at bedtime.  Dispense: 90 tablet; Refill: 1    4. History of suicidal ideation  -     Dextromethorphan-buPROPion ER (Auvelity)  MG tablet controlled-release; Take 1 tablet by mouth Every Morning.  Dispense: 90 tablet; Refill: 1    5. Primary osteoarthritis of both knees  -     naproxen (NAPROSYN) 500 MG tablet; Take 1 tablet by mouth 2 (Two) Times a Day With Meals.  Dispense: 180 tablet; Refill: 0      - Anxiety and depression are controlled and stable.  Continue use of Auvelity.  - Insomnia is currently controlled and stable with use of trazodone.  Will continue treatment.  - Provide refill of naproxen with reports of ongoing pain in bilateral knees.  - With patient reporting he will be moving send is 90 days to allow her to get established.           Follow Up   Return if symptoms worsen or fail to improve.  Patient was given instructions and  counseling regarding her condition or for health maintenance advice. Please see specific information pulled into the AVS if appropriate.     Signed by:    ALEXANDRA Medellin Date: 06/29/25

## 2025-06-30 ENCOUNTER — APPOINTMENT (OUTPATIENT)
Dept: GENERAL RADIOLOGY | Age: 55
DRG: 313 | End: 2025-06-30
Payer: MEDICAID

## 2025-06-30 ENCOUNTER — READMISSION MANAGEMENT (OUTPATIENT)
Dept: CALL CENTER | Facility: HOSPITAL | Age: 55
End: 2025-06-30
Payer: MEDICAID

## 2025-06-30 ENCOUNTER — APPOINTMENT (OUTPATIENT)
Age: 55
DRG: 313 | End: 2025-06-30
Attending: STUDENT IN AN ORGANIZED HEALTH CARE EDUCATION/TRAINING PROGRAM
Payer: MEDICAID

## 2025-06-30 ENCOUNTER — HOSPITAL ENCOUNTER (INPATIENT)
Age: 55
LOS: 1 days | Discharge: LEFT BEFORE DISCHARGE | DRG: 313 | End: 2025-06-30
Attending: STUDENT IN AN ORGANIZED HEALTH CARE EDUCATION/TRAINING PROGRAM | Admitting: STUDENT IN AN ORGANIZED HEALTH CARE EDUCATION/TRAINING PROGRAM
Payer: MEDICAID

## 2025-06-30 VITALS
RESPIRATION RATE: 16 BRPM | TEMPERATURE: 97.3 F | HEIGHT: 65 IN | WEIGHT: 226.19 LBS | DIASTOLIC BLOOD PRESSURE: 92 MMHG | HEART RATE: 50 BPM | SYSTOLIC BLOOD PRESSURE: 120 MMHG | OXYGEN SATURATION: 95 % | BODY MASS INDEX: 37.69 KG/M2

## 2025-06-30 DIAGNOSIS — R07.9 ACUTE CHEST PAIN: ICD-10-CM

## 2025-06-30 DIAGNOSIS — R07.9 CHEST PAIN, UNSPECIFIED TYPE: ICD-10-CM

## 2025-06-30 DIAGNOSIS — R00.1 SYMPTOMATIC BRADYCARDIA: Primary | ICD-10-CM

## 2025-06-30 LAB
ALBUMIN SERPL-MCNC: 4 G/DL (ref 3.5–5.2)
ALP SERPL-CCNC: 99 U/L (ref 35–104)
ALT SERPL-CCNC: 14 U/L (ref 10–35)
ANION GAP SERPL CALCULATED.3IONS-SCNC: 11 MMOL/L (ref 8–16)
AST SERPL-CCNC: 17 U/L (ref 10–35)
BASOPHILS # BLD: 0.1 K/UL (ref 0–0.2)
BASOPHILS NFR BLD: 0.9 % (ref 0–1)
BILIRUB SERPL-MCNC: <0.2 MG/DL (ref 0.2–1.2)
BNP BLD-MCNC: <36 PG/ML (ref 0–124)
BUN SERPL-MCNC: 25 MG/DL (ref 6–20)
CALCIUM SERPL-MCNC: 9 MG/DL (ref 8.6–10)
CHLORIDE SERPL-SCNC: 108 MMOL/L (ref 98–107)
CO2 SERPL-SCNC: 23 MMOL/L (ref 22–29)
CREAT SERPL-MCNC: 0.7 MG/DL (ref 0.5–0.9)
D DIMER PPP FEU-MCNC: <0.27 UG/ML FEU (ref 0–0.48)
EKG P AXIS: -16 DEGREES
EKG P AXIS: 55 DEGREES
EKG P-R INTERVAL: 138 MS
EKG P-R INTERVAL: 164 MS
EKG Q-T INTERVAL: 420 MS
EKG Q-T INTERVAL: 464 MS
EKG QRS DURATION: 100 MS
EKG QRS DURATION: 94 MS
EKG QTC CALCULATION (BAZETT): 440 MS
EKG QTC CALCULATION (BAZETT): 441 MS
EKG T AXIS: 41 DEGREES
EKG T AXIS: 44 DEGREES
EOSINOPHIL # BLD: 0.2 K/UL (ref 0–0.6)
EOSINOPHIL NFR BLD: 2.8 % (ref 0–5)
ERYTHROCYTE [DISTWIDTH] IN BLOOD BY AUTOMATED COUNT: 12.2 % (ref 11.5–14.5)
GLUCOSE SERPL-MCNC: 117 MG/DL (ref 70–99)
HBA1C MFR BLD: 5.1 % (ref 4–5.6)
HCT VFR BLD AUTO: 38.8 % (ref 37–47)
HGB BLD-MCNC: 12.7 G/DL (ref 12–16)
IMM GRANULOCYTES # BLD: 0 K/UL
LYMPHOCYTES # BLD: 1.8 K/UL (ref 1.1–4.5)
LYMPHOCYTES NFR BLD: 27.1 % (ref 20–40)
MCH RBC QN AUTO: 30.5 PG (ref 27–31)
MCHC RBC AUTO-ENTMCNC: 32.7 G/DL (ref 33–37)
MCV RBC AUTO: 93 FL (ref 81–99)
MONOCYTES # BLD: 0.5 K/UL (ref 0–0.9)
MONOCYTES NFR BLD: 7.8 % (ref 0–10)
NEUTROPHILS # BLD: 4.1 K/UL (ref 1.5–7.5)
NEUTS SEG NFR BLD: 61.1 % (ref 50–65)
PLATELET # BLD AUTO: 223 K/UL (ref 130–400)
PMV BLD AUTO: 9.1 FL (ref 9.4–12.3)
POTASSIUM SERPL-SCNC: 3.7 MMOL/L (ref 3.5–5)
PROT SERPL-MCNC: 6.1 G/DL (ref 6.4–8.3)
RBC # BLD AUTO: 4.17 M/UL (ref 4.2–5.4)
SODIUM SERPL-SCNC: 142 MMOL/L (ref 136–145)
T4 FREE SERPL-MCNC: 1.21 NG/DL (ref 0.93–1.7)
TROPONIN, HIGH SENSITIVITY: 7 NG/L (ref 0–14)
TROPONIN, HIGH SENSITIVITY: 9 NG/L (ref 0–14)
TROPONIN, HIGH SENSITIVITY: <6 NG/L (ref 0–14)
TROPONIN, HIGH SENSITIVITY: <6 NG/L (ref 0–14)
TSH SERPL DL<=0.005 MIU/L-ACNC: 1.99 UIU/ML (ref 0.27–4.2)
WBC # BLD AUTO: 6.8 K/UL (ref 4.8–10.8)

## 2025-06-30 PROCEDURE — 84443 ASSAY THYROID STIM HORMONE: CPT

## 2025-06-30 PROCEDURE — 6370000000 HC RX 637 (ALT 250 FOR IP): Performed by: STUDENT IN AN ORGANIZED HEALTH CARE EDUCATION/TRAINING PROGRAM

## 2025-06-30 PROCEDURE — 93005 ELECTROCARDIOGRAM TRACING: CPT | Performed by: PHYSICIAN ASSISTANT

## 2025-06-30 PROCEDURE — 80053 COMPREHEN METABOLIC PANEL: CPT

## 2025-06-30 PROCEDURE — 85025 COMPLETE CBC W/AUTO DIFF WBC: CPT

## 2025-06-30 PROCEDURE — G0378 HOSPITAL OBSERVATION PER HR: HCPCS

## 2025-06-30 PROCEDURE — 93010 ELECTROCARDIOGRAM REPORT: CPT | Performed by: INTERNAL MEDICINE

## 2025-06-30 PROCEDURE — 2500000003 HC RX 250 WO HCPCS: Performed by: STUDENT IN AN ORGANIZED HEALTH CARE EDUCATION/TRAINING PROGRAM

## 2025-06-30 PROCEDURE — 71045 X-RAY EXAM CHEST 1 VIEW: CPT

## 2025-06-30 PROCEDURE — 36415 COLL VENOUS BLD VENIPUNCTURE: CPT

## 2025-06-30 PROCEDURE — 99285 EMERGENCY DEPT VISIT HI MDM: CPT

## 2025-06-30 PROCEDURE — 83036 HEMOGLOBIN GLYCOSYLATED A1C: CPT

## 2025-06-30 PROCEDURE — 99222 1ST HOSP IP/OBS MODERATE 55: CPT | Performed by: INTERNAL MEDICINE

## 2025-06-30 PROCEDURE — 96374 THER/PROPH/DIAG INJ IV PUSH: CPT

## 2025-06-30 PROCEDURE — 84439 ASSAY OF FREE THYROXINE: CPT

## 2025-06-30 PROCEDURE — 94760 N-INVAS EAR/PLS OXIMETRY 1: CPT

## 2025-06-30 PROCEDURE — 83880 ASSAY OF NATRIURETIC PEPTIDE: CPT

## 2025-06-30 PROCEDURE — 85379 FIBRIN DEGRADATION QUANT: CPT

## 2025-06-30 PROCEDURE — 93005 ELECTROCARDIOGRAM TRACING: CPT | Performed by: EMERGENCY MEDICINE

## 2025-06-30 PROCEDURE — 2580000003 HC RX 258: Performed by: STUDENT IN AN ORGANIZED HEALTH CARE EDUCATION/TRAINING PROGRAM

## 2025-06-30 PROCEDURE — 1200000000 HC SEMI PRIVATE

## 2025-06-30 PROCEDURE — 84484 ASSAY OF TROPONIN QUANT: CPT

## 2025-06-30 RX ORDER — ATORVASTATIN CALCIUM 40 MG/1
40 TABLET, FILM COATED ORAL NIGHTLY
Status: DISCONTINUED | OUTPATIENT
Start: 2025-06-30 | End: 2025-06-30 | Stop reason: HOSPADM

## 2025-06-30 RX ORDER — MORPHINE SULFATE 2 MG/ML
2 INJECTION, SOLUTION INTRAMUSCULAR; INTRAVENOUS
Status: DISCONTINUED | OUTPATIENT
Start: 2025-06-30 | End: 2025-06-30 | Stop reason: HOSPADM

## 2025-06-30 RX ORDER — POLYETHYLENE GLYCOL 3350 17 G/17G
17 POWDER, FOR SOLUTION ORAL DAILY PRN
Status: DISCONTINUED | OUTPATIENT
Start: 2025-06-30 | End: 2025-06-30 | Stop reason: HOSPADM

## 2025-06-30 RX ORDER — ASPIRIN 81 MG/1
81 TABLET, CHEWABLE ORAL DAILY
Status: DISCONTINUED | OUTPATIENT
Start: 2025-07-01 | End: 2025-06-30 | Stop reason: HOSPADM

## 2025-06-30 RX ORDER — MORPHINE SULFATE 2 MG/ML
INJECTION, SOLUTION INTRAMUSCULAR; INTRAVENOUS
Status: DISCONTINUED
Start: 2025-06-30 | End: 2025-06-30 | Stop reason: HOSPADM

## 2025-06-30 RX ORDER — POTASSIUM CHLORIDE 1500 MG/1
40 TABLET, EXTENDED RELEASE ORAL PRN
Status: DISCONTINUED | OUTPATIENT
Start: 2025-06-30 | End: 2025-06-30 | Stop reason: HOSPADM

## 2025-06-30 RX ORDER — ONDANSETRON 4 MG/1
4 TABLET, ORALLY DISINTEGRATING ORAL EVERY 8 HOURS PRN
Status: DISCONTINUED | OUTPATIENT
Start: 2025-06-30 | End: 2025-06-30 | Stop reason: HOSPADM

## 2025-06-30 RX ORDER — MORPHINE SULFATE 2 MG/ML
INJECTION, SOLUTION INTRAMUSCULAR; INTRAVENOUS DAILY PRN
Status: COMPLETED | OUTPATIENT
Start: 2025-06-30 | End: 2025-06-30

## 2025-06-30 RX ORDER — NAPROXEN 500 MG/1
TABLET ORAL 2 TIMES DAILY WITH MEALS
COMMUNITY

## 2025-06-30 RX ORDER — ACETAMINOPHEN 325 MG/1
650 TABLET ORAL EVERY 6 HOURS PRN
Status: DISCONTINUED | OUTPATIENT
Start: 2025-06-30 | End: 2025-06-30 | Stop reason: HOSPADM

## 2025-06-30 RX ORDER — SODIUM CHLORIDE 0.9 % (FLUSH) 0.9 %
5-40 SYRINGE (ML) INJECTION PRN
Status: DISCONTINUED | OUTPATIENT
Start: 2025-06-30 | End: 2025-06-30 | Stop reason: HOSPADM

## 2025-06-30 RX ORDER — SODIUM CHLORIDE 9 MG/ML
INJECTION, SOLUTION INTRAVENOUS PRN
Status: DISCONTINUED | OUTPATIENT
Start: 2025-06-30 | End: 2025-06-30 | Stop reason: HOSPADM

## 2025-06-30 RX ORDER — SODIUM CHLORIDE 9 MG/ML
INJECTION, SOLUTION INTRAVENOUS CONTINUOUS
Status: DISCONTINUED | OUTPATIENT
Start: 2025-06-30 | End: 2025-06-30 | Stop reason: HOSPADM

## 2025-06-30 RX ORDER — NITROGLYCERIN 0.4 MG/1
0.4 TABLET SUBLINGUAL PRN
Status: DISCONTINUED | OUTPATIENT
Start: 2025-06-30 | End: 2025-06-30 | Stop reason: HOSPADM

## 2025-06-30 RX ORDER — ONDANSETRON 2 MG/ML
4 INJECTION INTRAMUSCULAR; INTRAVENOUS EVERY 6 HOURS PRN
Status: DISCONTINUED | OUTPATIENT
Start: 2025-06-30 | End: 2025-06-30 | Stop reason: HOSPADM

## 2025-06-30 RX ORDER — TRAZODONE HYDROCHLORIDE 150 MG/1
300 TABLET ORAL NIGHTLY
COMMUNITY

## 2025-06-30 RX ORDER — ACETAMINOPHEN 650 MG/1
650 SUPPOSITORY RECTAL EVERY 6 HOURS PRN
Status: DISCONTINUED | OUTPATIENT
Start: 2025-06-30 | End: 2025-06-30 | Stop reason: HOSPADM

## 2025-06-30 RX ORDER — POTASSIUM CHLORIDE 7.45 MG/ML
10 INJECTION INTRAVENOUS PRN
Status: DISCONTINUED | OUTPATIENT
Start: 2025-06-30 | End: 2025-06-30 | Stop reason: HOSPADM

## 2025-06-30 RX ORDER — MAGNESIUM SULFATE IN WATER 40 MG/ML
2000 INJECTION, SOLUTION INTRAVENOUS PRN
Status: DISCONTINUED | OUTPATIENT
Start: 2025-06-30 | End: 2025-06-30 | Stop reason: HOSPADM

## 2025-06-30 RX ORDER — SODIUM CHLORIDE 0.9 % (FLUSH) 0.9 %
5-40 SYRINGE (ML) INJECTION EVERY 12 HOURS SCHEDULED
Status: DISCONTINUED | OUTPATIENT
Start: 2025-06-30 | End: 2025-06-30 | Stop reason: HOSPADM

## 2025-06-30 RX ADMIN — SODIUM CHLORIDE: 0.9 INJECTION, SOLUTION INTRAVENOUS at 04:54

## 2025-06-30 RX ADMIN — SODIUM CHLORIDE, PRESERVATIVE FREE 10 ML: 5 INJECTION INTRAVENOUS at 08:38

## 2025-06-30 RX ADMIN — NITROGLYCERIN 0.4 MG: 0.4 TABLET, ORALLY DISINTEGRATING SUBLINGUAL at 06:25

## 2025-06-30 RX ADMIN — NITROGLYCERIN 0.4 MG: 0.4 TABLET, ORALLY DISINTEGRATING SUBLINGUAL at 06:13

## 2025-06-30 RX ADMIN — NITROGLYCERIN 0.4 MG: 0.4 TABLET, ORALLY DISINTEGRATING SUBLINGUAL at 06:18

## 2025-06-30 RX ADMIN — NITROGLYCERIN 0.5 INCH: 20 OINTMENT TOPICAL at 08:37

## 2025-06-30 RX ADMIN — MORPHINE SULFATE 2 MG: 2 INJECTION, SOLUTION INTRAMUSCULAR; INTRAVENOUS at 06:00

## 2025-06-30 RX ADMIN — LIDOCAINE HYDROCHLORIDE: 20 SOLUTION ORAL at 06:32

## 2025-06-30 SDOH — ECONOMIC STABILITY: FOOD INSECURITY: WITHIN THE PAST 12 MONTHS, YOU WORRIED THAT YOUR FOOD WOULD RUN OUT BEFORE YOU GOT MONEY TO BUY MORE.: NEVER TRUE

## 2025-06-30 SDOH — ECONOMIC STABILITY: INCOME INSECURITY: HOW HARD IS IT FOR YOU TO PAY FOR THE VERY BASICS LIKE FOOD, HOUSING, MEDICAL CARE, AND HEATING?: NOT HARD AT ALL

## 2025-06-30 SDOH — ECONOMIC STABILITY: INCOME INSECURITY: IN THE PAST 12 MONTHS, HAS THE ELECTRIC, GAS, OIL, OR WATER COMPANY THREATENED TO SHUT OFF SERVICE IN YOUR HOME?: NO

## 2025-06-30 ASSESSMENT — ENCOUNTER SYMPTOMS
BACK PAIN: 0
EYE PAIN: 0
SHORTNESS OF BREATH: 0
RHINORRHEA: 0
NAUSEA: 0
COUGH: 0
APNEA: 0
ABDOMINAL DISTENTION: 0
EYE DISCHARGE: 0
PHOTOPHOBIA: 0
ABDOMINAL PAIN: 0
SORE THROAT: 0
COLOR CHANGE: 0

## 2025-06-30 ASSESSMENT — PAIN SCALES - GENERAL
PAINLEVEL_OUTOF10: 6
PAINLEVEL_OUTOF10: 0
PAINLEVEL_OUTOF10: 0
PAINLEVEL_OUTOF10: 6
PAINLEVEL_OUTOF10: 10

## 2025-06-30 ASSESSMENT — PATIENT HEALTH QUESTIONNAIRE - PHQ9
SUM OF ALL RESPONSES TO PHQ QUESTIONS 1-9: 0
2. FEELING DOWN, DEPRESSED OR HOPELESS: NOT AT ALL
1. LITTLE INTEREST OR PLEASURE IN DOING THINGS: NOT AT ALL

## 2025-06-30 ASSESSMENT — PAIN - FUNCTIONAL ASSESSMENT
PAIN_FUNCTIONAL_ASSESSMENT: INTOLERABLE, UNABLE TO DO ANY ACTIVE OR PASSIVE ACTIVITIES
PAIN_FUNCTIONAL_ASSESSMENT: INTOLERABLE, UNABLE TO DO ANY ACTIVE OR PASSIVE ACTIVITIES
PAIN_FUNCTIONAL_ASSESSMENT: 0-10
PAIN_FUNCTIONAL_ASSESSMENT: INTOLERABLE, UNABLE TO DO ANY ACTIVE OR PASSIVE ACTIVITIES

## 2025-06-30 ASSESSMENT — PAIN DESCRIPTION - LOCATION
LOCATION: CHEST

## 2025-06-30 ASSESSMENT — PAIN DESCRIPTION - ORIENTATION
ORIENTATION: MID
ORIENTATION: MID
ORIENTATION: OTHER (COMMENT)

## 2025-06-30 ASSESSMENT — PAIN DESCRIPTION - DESCRIPTORS
DESCRIPTORS: ACHING
DESCRIPTORS: SHOOTING;SHARP
DESCRIPTORS: ACHING

## 2025-06-30 NOTE — PLAN OF CARE
Problem: Discharge Planning  Goal: Discharge to home or other facility with appropriate resources  6/30/2025 1215 by Diandra Abraham RN  Outcome: Progressing  Flowsheets (Taken 6/30/2025 0837)  Discharge to home or other facility with appropriate resources: Identify barriers to discharge with patient and caregiver  6/30/2025 0349 by Kalina Lubin LPN  Outcome: Progressing  Flowsheets (Taken 6/30/2025 0321)  Discharge to home or other facility with appropriate resources: Identify barriers to discharge with patient and caregiver     Problem: Safety - Adult  Goal: Free from fall injury  6/30/2025 1215 by Diandra Abraham RN  Outcome: Progressing  6/30/2025 0349 by Kalina Lubin LPN  Outcome: Progressing  Flowsheets (Taken 6/30/2025 0315)  Free From Fall Injury: Instruct family/caregiver on patient safety     Problem: Chronic Conditions and Co-morbidities  Goal: Patient's chronic conditions and co-morbidity symptoms are monitored and maintained or improved  6/30/2025 1215 by Diandra Abraham RN  Outcome: Progressing  Flowsheets (Taken 6/30/2025 0837)  Care Plan - Patient's Chronic Conditions and Co-Morbidity Symptoms are Monitored and Maintained or Improved: Monitor and assess patient's chronic conditions and comorbid symptoms for stability, deterioration, or improvement  6/30/2025 0349 by Kalina Lubin LPN  Outcome: Progressing     Problem: Pain  Goal: Verbalizes/displays adequate comfort level or baseline comfort level  6/30/2025 1215 by Diandra Abraham RN  Outcome: Progressing  6/30/2025 0349 by Kalina Lubin LPN  Outcome: Progressing     Problem: Cardiovascular - Adult  Goal: Maintains optimal cardiac output and hemodynamic stability  6/30/2025 1215 by Diandra Abraham RN  Outcome: Progressing  6/30/2025 0349 by Kalina Lubin LPN  Outcome: Progressing  Goal: Absence of cardiac dysrhythmias or at baseline  6/30/2025 1215 by Diandra Abraham RN  Outcome:

## 2025-06-30 NOTE — SIGNIFICANT EVENT
Patient complaints: chest pain. rapid response called. nitro,morphine ordered and given. vital signs obtained. patient stated that nitro and morphine did not work. complaints of pain centered lower sternum. Telemetry stated no change showing. EKG was negative. Troponin with in normal limits.  GI cocktail ordered and given to patient. Primary to reassess.    Electronically signed by Marah Esparza RN on 6/30/25 at 8:27 AM CDT

## 2025-06-30 NOTE — H&P
Hospitalist: History and Physical    Date: 2025 Time: 4:02 AM    Name: Neha Donaldson : 1970 MRN: [id number]    Code Status: Full Code No additional code details    PCP: No primary care provider on file.    Patient's Chief Complaint Is:  Chest pain  HPI: Patient is a 54 y.o. female with a past medical history of hypertension. Patient presented to [facility] ED due to chest pain that started at 2300 this night. She said that chest pain is located in the center of the chest. She said that chest pain is radiating to the back. She received NTG and ASA given by EMS that helped reduce chest pain. Patient denies fever, chills, visual problem, dysphagia,  shortness of breath,  cough, bowel or bladder incontinence, abdominal pain, diarrhea or dysuria.   Blood work is unremarkable.    History reviewed. No pertinent past medical history.  Past Surgical History:   Procedure Laterality Date    GASTRIC BYPASS SURGERY       History reviewed. No pertinent family history.  Social History     Socioeconomic History    Marital status: Single     Spouse name: Not on file    Number of children: Not on file    Years of education: Not on file    Highest education level: Not on file   Occupational History    Not on file   Tobacco Use    Smoking status: Former     Types: Cigarettes    Smokeless tobacco: Never   Vaping Use    Vaping status: Never Used   Substance and Sexual Activity    Alcohol use: Not Currently    Drug use: Yes     Frequency: 1.0 times per week     Types: Marijuana (Weed)    Sexual activity: Not on file   Other Topics Concern    Not on file   Social History Narrative    Not on file     Social Drivers of Health     Financial Resource Strain: Low Risk  (2025)    Overall Financial Resource Strain (CARDIA)     Difficulty of Paying Living Expenses: Not hard at all   Food Insecurity: No Food Insecurity (2025)    Hunger Vital Sign     Worried About Running Out of Food in the Last Year: Never true     Ran Out of

## 2025-06-30 NOTE — PROGRESS NOTES
Patient wanting to leave AMA.  AMA form signed.  IV access and telemetry discontinued.  Patient DC AMA

## 2025-06-30 NOTE — DISCHARGE SUMMARY
Neha Donaldson  :  1970  MRN:  [id number]    Admit date:  2025 Discharge date:  25    Discharging Physician:  DR Burks     Advance Directive: Full Code    Consults: IP CONSULT TO CARDIOLOGY     Primary Care Physician:  No primary care provider on file.    Discharge Diagnoses:  Principal Problem:    Chest pain  Resolved Problems:    * No resolved hospital problems. *      Portions of this note have been copied forward, however, changed to reflect the most current clinical status of this patient.  Hospital Course:   54-year-old female with HTN with complaints of chest pain.  States that she began having chest pain while at rest located in the center of her chest that radiates into her back.  Upon [facility] arrival received nitroglycerin and GI cocktail and resolved chest pain.  Workup in ER EKG NSR no acute ischemic change and troponin negative.  Patient admitted to hospitalist service with consultation to cardiology.  Discussed with patient plan for Lexiscan and echocardiogram today was agreeable on evaluation however later on decided to leave AGAINST MEDICAL ADVICE.Patient is alert and oriented and of sound mind.  He is adamant to be discharged.  Left AGAINST MEDICAL ADVICE.  Patient was stressed on the importance to stay for further evaluation.  He is instructed on potential risks including death and was advised he can return to ER if symptoms worsen.  Significant Diagnostic Studies:   XR CHEST PORTABLE  Result Date: 2025  EXAM:  FRONTAL VIEW OF THE CHEST.  HISTORY:  Shortness of breath.  COMPARISON:  None.  FINDINGS:   Cardiac silhouette is normal.  No organized infiltrate/consolidation.  No visible effusion or pneumothorax.  Reverse arthroplasty right shoulder.       1. No acute findings.      ______________________________________ Electronically signed by: ROSITA BOYD M.D. Date:     2025 Time:    01:26     Pertinent Labs:   CBC:   Recent Labs     25  0017   WBC 6.8   HGB 12.7

## 2025-06-30 NOTE — CONSULTS
[facility]  Cardiology Consult      Requesting MD:  Juan Burks MD   Admit Status:         History obtained from:   [] Patient  [] Other (specify):     Patient:  Neha Donaldson  [id number]     Chief Complaint:   Chief Complaint   Patient presents with    Chest Pain     Patient arrive from home with c/o chest pain radiating into the back which began about 2300 this evening.  Patient given 324 asa, nitro sl x 3, and 100 mcg Fentanyl         HPI: Ms. Donaldson is a 54 y.o. female with a history of diabetes mellitus with obesity, gastric bypass surgery, bipolar affective disorder, anxiety, brain cancer with craniotomy for cavernous sinus meningioma in 2014, and headaches, presenting for evaluation of chest pain.  The pain began earlier last night while she was lying in bed.  She describes it as a sharp, midsternal pain without radiation.  The pain came on suddenly and lasted about 30 minutes which prompted her to come to the emergency room.  She was given GI cocktail, which relieved the pain completely.  She has remained pain-free since.  She denies any aggravating or relieving factors prior to the GI cocktail.  She rates the pain as moderate in intensity.  She denies associated shortness of breath, palpitations, nausea, vomiting, diaphoresis, dizziness, presyncope, or syncope.  She denies prior similar episodes and has no known history of cardiac related problems or testing.  She denies recent illness, fever, cough, or upper respiratory symptoms, and has no history of recent long travel or immobilization.    Review of Systems:  Review of Systems   Constitutional:  Negative for activity change, diaphoresis and fatigue.   Respiratory:  Negative for cough and shortness of breath.    Cardiovascular:  Positive for chest pain. Negative for palpitations and leg swelling.   Gastrointestinal:  Negative for abdominal distention and abdominal pain.   Skin:  Negative for color change.   Neurological:

## 2025-06-30 NOTE — ED NOTES
ED TO INPATIENT SBAR HANDOFF    Patient Name: Neha Donaldson   : 1970  54 y.o.   Family/Caregiver Present: No  Code Status Order: Full Code    C-SSRS: Risk of Suicide: No Risk  Sitter No  Restraints:         Situation  Chief Complaint:   Chief Complaint   Patient presents with    Chest Pain     Patient arrive from home with c/o chest pain radiating into the back which began about 2300 this evening.  Patient given 324 asa, nitro sl x 3, and 100 mcg Fentanyl       Patient Diagnosis: Chest pain [R07.9]     Brief Description of Patient's Condition: Neha Donaldson is a 54 y.o. female who presents to the emergency department with complaints of acute chest pain radiating into back acute onset 2300 came by EMS nitro and asa have helped with pain. She denies cough fever or pleurisy. She arrives with chest pain midline not reproduced exertional. She has BMI 36 former smoker with family hx. Prior brain CA hx in remission getting MRI scheduled to confirm no issues.   Mental Status: oriented, alert, coherent, logical, thought processes intact, and able to concentrate and follow conversation  Arrived from: home    Imaging:   XR CHEST PORTABLE   Final Result       1. No acute findings.                       ______________________________________    Electronically signed by: ROSITA BOYD M.D.   Date:     2025   Time:    01:26         COVID-19 Results:   Internal Administration   First Dose COVID-19, PFIZER PURPLE top, DILUTE for use, (age 12 y+), 30mcg/0.3mL  2021   Second Dose COVID-19, PFIZER PURPLE top, DILUTE for use, (age 12 y+), 30mcg/0.3mL   2021       Last COVID Lab No results found for: \"SARS-COV-2\"        Abnormal labs:   Abnormal Labs Reviewed   CBC WITH AUTO DIFFERENTIAL - Abnormal; Notable for the following components:       Result Value    RBC 4.17 (*)     MCHC 32.7 (*)     MPV 9.1 (*)     All other components within normal limits   COMPREHENSIVE METABOLIC PANEL W/ REFLEX TO MG FOR LOW K -

## 2025-06-30 NOTE — PLAN OF CARE
Problem: Discharge Planning  Goal: Discharge to home or other facility with appropriate resources  Outcome: Progressing  Flowsheets (Taken 6/30/2025 0321)  Discharge to home or other facility with appropriate resources: Identify barriers to discharge with patient and caregiver     Problem: Safety - Adult  Goal: Free from fall injury  Outcome: Progressing  Flowsheets (Taken 6/30/2025 0315)  Free From Fall Injury: Instruct family/caregiver on patient safety     Problem: Chronic Conditions and Co-morbidities  Goal: Patient's chronic conditions and co-morbidity symptoms are monitored and maintained or improved  Outcome: Progressing     Problem: Pain  Goal: Verbalizes/displays adequate comfort level or baseline comfort level  Outcome: Progressing     Problem: Cardiovascular - Adult  Goal: Maintains optimal cardiac output and hemodynamic stability  Outcome: Progressing  Goal: Absence of cardiac dysrhythmias or at baseline  Outcome: Progressing

## 2025-06-30 NOTE — PROGRESS NOTES
Patient stated that the GI cocktail that she was given took away the pain . States their is no pain now.

## 2025-06-30 NOTE — PROGRESS NOTES
4 Eyes Skin Assessment     NAME:  Neha Donaldson  YOB: 1970  MEDICAL RECORD NUMBER:  [id number]    The patient is being assessed for  Admission    I agree that at least one RN has performed a thorough Head to Toe Skin Assessment on the patient. ALL assessment sites listed below have been assessed.      Areas assessed by both nurses:    Head, Face, Ears, Shoulders, Back, Chest, Arms, Elbows, Hands, Sacrum. Buttock, Coccyx, Ischium, Legs. Feet and Heels, and Under Medical Devices         Does the Patient have a Wound? No noted wound(s)       Jadon Prevention initiated by RN: No  Wound Care Orders initiated by RN: No    Pressure Injury (Stage 1,2,3,4, Unstageable, DTI, NWPT, and Complex wounds) if present, place Wound referral order by RN under : No    New Ostomies, if present place, Ostomy referral order under : No     Nurse 1 eSignature: Electronically signed by Iesha Lee RN on 6/30/25 at 3:48 AM CDT    **SHARE this note so that the co-signing nurse can place an eSignature**    Nurse 2 eSignature: Electronically signed by Marah Esparza RN on 6/30/25 at 8:24 AM CDT

## 2025-06-30 NOTE — ED PROVIDER NOTES
[facility] EMERGENCY DEPARTMENT  eMERGENCYdEPARTMENT eNCOUnter      Pt Name: Neha Donaldson  MRN: [id number]  Birthdate 1970  Date of evaluation: 6/30/2025  Provider:JERRY Sánchez    CHIEF COMPLAINT       Chief Complaint   Patient presents with    Chest Pain     Patient arrive from home with c/o chest pain radiating into the back which began about 2300 this evening.  Patient given 324 asa, nitro sl x 3, and 100 mcg Fentanyl           HISTORY OF PRESENT ILLNESS  (Location/Symptom, Timing/Onset, Context/Setting, Quality, Duration, Modifying Factors, Severity.)   Neha Donaldson is a 54 y.o. female who presents to the emergency department with complaints of acute chest pain radiating into back acute onset 2300 came by EMS nitro and asa have helped with pain. She denies cough fever or pleurisy. She arrives with chest pain midline not reproduced exertional. She has BMI 36 former smoker with family hx. Prior brain CA hx in remission getting MRI scheduled to confirm no issues.     HPI    Nursing Notes were reviewed and I agree.    REVIEW OF SYSTEMS    (2-9 systems for level 4, 10 or more for level 5)     Review of Systems   Constitutional:  Negative for activity change, appetite change, chills and fever.   HENT:  Negative for congestion, postnasal drip, rhinorrhea and sore throat.    Eyes:  Negative for photophobia, pain, discharge and visual disturbance.   Respiratory:  Negative for apnea, cough and shortness of breath.    Cardiovascular:  Positive for chest pain. Negative for leg swelling.   Gastrointestinal:  Negative for abdominal distention, abdominal pain and nausea.   Genitourinary:  Negative for vaginal bleeding.   Musculoskeletal:  Negative for arthralgias, back pain, joint swelling, neck pain and neck stiffness.   Skin:  Negative for color change and rash.   Neurological:  Negative for dizziness, syncope, facial asymmetry and headaches.   Hematological:  Negative for adenopathy. Does not bruise/bleed easily.

## 2025-07-01 ENCOUNTER — TRANSITIONAL CARE MANAGEMENT TELEPHONE ENCOUNTER (OUTPATIENT)
Dept: CALL CENTER | Facility: HOSPITAL | Age: 55
End: 2025-07-01
Payer: MEDICAID

## 2025-07-01 NOTE — OUTREACH NOTE
Call Center TCM Note      Flowsheet Row Responses   [facility] patient discharged from? Non-BH   Does the patient have one of the following disease processes/diagnoses(primary or secondary)? Other   TCM attempt successful? No  [Jason, friend]   Unsuccessful attempts Attempt 1   Call Status Left message            Janny Melendez Registered Nurse    7/1/2025, 11:32 CDT

## 2025-07-01 NOTE — OUTREACH NOTE
Call Center TCM Note      Flowsheet Row Responses   [facility] patient discharged from? Non-BH   Does the patient have one of the following disease processes/diagnoses(primary or secondary)? Other   TCM attempt successful? No   Unsuccessful attempts Attempt 2   Call Status Left message            Janny Melendez Registered Nurse    7/1/2025, 13:16 CDT

## 2025-07-01 NOTE — OUTREACH NOTE
Prep Survey      Flowsheet Row Responses   [facility] patient discharged from? Non-BH   Is LACE score < 7 ? Non- Discharge   Eligibility [facility]   Date of Admission 06/30/25   Date of Discharge 06/30/25   Discharge diagnosis Symptomatic bradycardia   Does the patient have one of the following disease processes/diagnoses(primary or secondary)? Other   Prep survey completed? Yes            Ria CHOUDHARY - Registered Nurse

## 2025-07-02 ENCOUNTER — TRANSITIONAL CARE MANAGEMENT TELEPHONE ENCOUNTER (OUTPATIENT)
Dept: CALL CENTER | Facility: HOSPITAL | Age: 55
End: 2025-07-02
Payer: MEDICAID

## 2025-07-02 NOTE — OUTREACH NOTE
Call Center TCM Note      Flowsheet Row Responses   [facility] [place] [facility]? Non-[facility]   Does the patient have one of the following disease processes/diagnoses(primary or secondary)? Other   TCM attempt successful? No   Unsuccessful attempts Attempt 3  [Jason deferred questions to pt]            Elisa IVY - Registered Nurse    7/2/2025, 12:53 CDT

## 2025-08-18 DIAGNOSIS — M17.0 PRIMARY OSTEOARTHRITIS OF BOTH KNEES: ICD-10-CM

## 2025-08-19 RX ORDER — NAPROXEN 500 MG/1
500 TABLET ORAL 2 TIMES DAILY WITH MEALS
Qty: 60 TABLET | Refills: 0 | OUTPATIENT
Start: 2025-08-19